# Patient Record
Sex: MALE | Race: WHITE | Employment: FULL TIME | URBAN - METROPOLITAN AREA
[De-identification: names, ages, dates, MRNs, and addresses within clinical notes are randomized per-mention and may not be internally consistent; named-entity substitution may affect disease eponyms.]

---

## 2019-01-11 ENCOUNTER — OFFICE VISIT (OUTPATIENT)
Dept: FAMILY MEDICINE CLINIC | Age: 34
End: 2019-01-11

## 2019-01-11 VITALS
RESPIRATION RATE: 20 BRPM | TEMPERATURE: 97.7 F | HEIGHT: 67 IN | OXYGEN SATURATION: 96 % | BODY MASS INDEX: 24.64 KG/M2 | SYSTOLIC BLOOD PRESSURE: 132 MMHG | HEART RATE: 91 BPM | DIASTOLIC BLOOD PRESSURE: 90 MMHG | WEIGHT: 157 LBS

## 2019-01-11 DIAGNOSIS — R19.5 OCCULT BLOOD IN STOOLS: ICD-10-CM

## 2019-01-11 DIAGNOSIS — R63.1 EXCESSIVE THIRST: ICD-10-CM

## 2019-01-11 DIAGNOSIS — K58.2 IRRITABLE BOWEL SYNDROME WITH BOTH CONSTIPATION AND DIARRHEA: ICD-10-CM

## 2019-01-11 DIAGNOSIS — R53.83 FATIGUE, UNSPECIFIED TYPE: ICD-10-CM

## 2019-01-11 DIAGNOSIS — R03.0 ELEVATED BLOOD PRESSURE READING IN OFFICE WITHOUT DIAGNOSIS OF HYPERTENSION: ICD-10-CM

## 2019-01-11 DIAGNOSIS — Z13.228 SCREENING FOR METABOLIC DISORDER: Primary | ICD-10-CM

## 2019-01-11 DIAGNOSIS — R35.0 FREQUENT URINATION: ICD-10-CM

## 2019-01-11 LAB
BILIRUB UR QL STRIP: NEGATIVE
GLUCOSE UR-MCNC: NORMAL MG/DL
KETONES P FAST UR STRIP-MCNC: NORMAL MG/DL
PH UR STRIP: 6.5 [PH] (ref 4.6–8)
PROT UR QL STRIP: NEGATIVE
SP GR UR STRIP: 1.01 (ref 1–1.03)
UA UROBILINOGEN AMB POC: NORMAL (ref 0.2–1)
URINALYSIS CLARITY POC: CLEAR
URINALYSIS COLOR POC: YELLOW
URINE BLOOD POC: NEGATIVE
URINE LEUKOCYTES POC: NEGATIVE
URINE NITRITES POC: NEGATIVE

## 2019-01-11 RX ORDER — ASPIRIN 81 MG/1
TABLET ORAL DAILY
COMMUNITY

## 2019-01-11 NOTE — PATIENT INSTRUCTIONS
Diet for Irritable Bowel Syndrome: Care Instructions  Your Care Instructions    Irritable bowel syndrome, or IBS, is a problem with the intestines. IBS can cause belly pain, bloating, gas, constipation, and diarrhea. Most people can control their symptoms by changing their diet and easing stress. No specific foods cause everyone with IBS to have symptoms. Doctors don't offer a specific diet to manage symptoms. But many people find that they feel better when they stop eating certain foods. A high-fiber diet may help if you have constipation. Follow-up care is a key part of your treatment and safety. Be sure to make and go to all appointments, and call your doctor if you are having problems. It's also a good idea to know your test results and keep a list of the medicines you take. How can you care for yourself at home? To reduce constipation  · Include fruits, vegetables, beans, and whole grains in your diet each day. These foods are high in fiber. Slowly increase the amount of fiber you eat. This helps you avoid a lot of gas. · Drink plenty of fluids, enough so that your urine is light yellow or clear like water. If you have kidney, heart, or liver disease and have to limit fluids, talk with your doctor before you increase the amount of fluids you drink. · Get some exercise every day. Build up slowly to 30 to 60 minutes a day on 5 or more days of the week. · Take a fiber supplement, such as Citrucel or Metamucil, every day if needed. Read and follow all instructions on the label. · Schedule time each day for a bowel movement. Having a daily routine may help. Take your time and do not strain when having a bowel movement. · Check with your doctor before you increase the amount of fiber in your diet. For some people who have IBS, eating more fiber may make some symptoms worse. This includes bloating. To reduce diarrhea  You may try giving up foods or drinks one at a time to see whether symptoms improve. Limit or avoid the following:  · Alcohol  · Caffeine, which is found in coffee, tea, cola drinks, and chocolate  · Nicotine, from smoking or chewing tobacco  · Gas-producing foods, such as beans, broccoli, cabbage, and apples  · Dairy products that contain lactose (milk sugar), such as ice cream, milk, cheese, and sour cream  · Foods and drinks high in sugar, especially fruit juice, soda, candy, and other packaged sweets (such as cookies)  · Foods high in fat, including lim, sausage, butter, oils, and anything deep-fried  · Sorbitol and xylitol, artificial sweeteners found in some sugarless candies and chewing gum  Keep track of foods  · Some people with IBS use a daily food diary to keep track of what they eat and whether they have any symptoms after eating certain foods. The diary also can be a good way to record what is going on in your life. · Stress plays a role in IBS. So if you are aware that certain stresses bring on symptoms, you can try to reduce those stresses. Keep mealtimes pleasant  · Try to maintain a pleasant environment when you eat. This may reduce stress that can make symptoms likely to occur. · Give yourself plenty of time to eat, rather than eating on the go. Chew your food slowly. Try not to swallow air, which can cause bloating. Where can you learn more? Go to http://shonda-emil.info/. Enter W212 in the search box to learn more about \"Diet for Irritable Bowel Syndrome: Care Instructions. \"  Current as of: March 29, 2018  Content Version: 11.8  © 2298-0100 [employer]. Care instructions adapted under license by [employer] (which disclaims liability or warranty for this information). If you have questions about a medical condition or this instruction, always ask your healthcare professional. [address] any warranty or liability for your use of this information.

## 2019-01-11 NOTE — PROGRESS NOTES
OFFICE NOTE    Javed Elaine is a 26 y.o. male presenting today for office visit. 1/11/2019  4:21 PM    Chief Complaint   Patient presents with   [address]     pt here to establish care    Urinary Frequency     pt here wiith c/o frequent urination has noted for a year       HPI: Patient presenting to office today to establish care. Previous PCP Dr. Delaney Brown. Patient looking for new PCP closer to home. Patient states he has a history of IBS and he is currently not taking any medications. He states within the last week he has notice bright red blood in his stool. He states he was checked for blood in his stool 2 years ago while active in the [employer] but was told it was probably due to constipation or hemroids. Patient states he has daily bowel movements. He also complains of frequent thirst for over a year and within the last week he has developed frequent urination. Patient states his urine stream is normal and denies any discharge or pain during urination. Patient states he use to be on hypertensive medication 3 years ago and was able to come off medication because his blood pressure was normal and under control. Patient has not monitored his blood pressures. Patient denies Chest pain, SOB, headaches, fever, or stomach pain. No other concerns. Review of Systems   Constitutional: Positive for fatigue. Negative for appetite change, chills and fever. HENT: Negative. Respiratory: Negative for cough, chest tightness, shortness of breath and wheezing. Cardiovascular: Negative for chest pain, palpitations and leg swelling. Gastrointestinal: Positive for blood in stool, constipation and diarrhea. Negative for abdominal distention, abdominal pain, nausea and vomiting. Endocrine: Positive for polydipsia and polyuria. Genitourinary: Positive for frequency. Negative for decreased urine volume, difficulty urinating, discharge, dysuria, flank pain and urgency.    Musculoskeletal: Negative. Neurological: Positive for weakness. Negative for dizziness, light-headedness, numbness and headaches. PHQ Screening   PHQ over the last two weeks 1/11/2019   Little interest or pleasure in doing things Several days   Feeling down, depressed, irritable, or hopeless Several days   Total Score PHQ 2 2         History  Past Medical History:   Diagnosis Date    Hypertension     Irritable bowel syndrome        Past Surgical History:   Procedure Laterality Date    HX HEENT      wisdom teeth extraction    HX ORTHOPAEDIC      hammer toe surgery       Social History     Socioeconomic History    Marital status:      Spouse name: Not on file    Number of children: Not on file    Years of education: Not on file    Highest education level: Not on file   Social Needs    Financial resource strain: Not on file    Food insecurity - worry: Not on file    Food insecurity - inability: Not on file   [employer] needs - medical: Not on file   [employer] needs - non-medical: Not on file   Occupational History    Not on file   Tobacco Use    Smoking status: Current Every Day Smoker     Types: Cigars    Smokeless tobacco: Never Used   Substance and Sexual Activity    Alcohol use: Yes     Comment: occasional    Drug use: No    Sexual activity: Not on file   Other Topics Concern    Not on file   Social History Narrative    Not on file       Family History   Problem Relation Age of Onset    High Cholesterol Mother     Diabetes Father     Cancer Sister     Diabetes Maternal Grandmother     Dementia Paternal Grandmother     Heart Disease Paternal Grandfather     Heart Attack Paternal Grandfather        Allergies no known allergies    Current Outpatient Medications   Medication Sig Dispense Refill    aspirin delayed-release 81 mg tablet Take  by mouth daily.            Patient Care Team:  Patient Care Team:  Caden Connolly NP as PCP - General (Nurse Practitioner)        LABS/Results:  No results found for this or any previous visit. RADIOLOGY:  None new to review      Physical Exam   Constitutional: He is oriented to person, place, and time. He appears well-developed and well-nourished. HENT:   Right Ear: External ear normal.   Left Ear: External ear normal.   Eyes: Pupils are equal, round, and reactive to light. Neck: Normal range of motion. Neck supple. Cardiovascular: Normal rate, regular rhythm and normal heart sounds. Pulmonary/Chest: Effort normal and breath sounds normal. No respiratory distress. He has no wheezes. Abdominal: Soft. Bowel sounds are normal. He exhibits no distension. There is no tenderness. There is no rebound and no guarding. Musculoskeletal: Normal range of motion. Lymphadenopathy:     He has no cervical adenopathy. Neurological: He is alert and oriented to person, place, and time. He has normal reflexes. Skin: Skin is warm and dry. Psychiatric: He has a normal mood and affect. Vitals:    01/11/19 1609 01/11/19 1645   BP: (!) 132/96 132/90   Pulse: 91    Resp: 20    Temp: 97.7 °F (36.5 °C)    TempSrc: Oral    SpO2: 96%    Weight: 157 lb (71.2 kg)    Height: 5' 7\" (1.702 m)    PainSc:   0 - No pain          Assessment and Plan      ICD-10-CM ICD-9-CM    1. Screening for metabolic disorder S52.694 V77.99 LIPID PANEL   2. Fatigue, unspecified type R53.83 780.79 CBC W/O DIFF      VITAMIN D, 25 HYDROXY      HEMOGLOBIN A1C WITH EAG   3. Irritable bowel syndrome with both constipation and diarrhea K58.2 564.1 CBC W/O DIFF      REFERRAL TO GASTROENTEROLOGY      CANCELED: REFERRAL TO GASTROENTEROLOGY   4. Elevated blood pressure reading in office without diagnosis of hypertension L35.3 131.8 METABOLIC PANEL, COMPREHENSIVE      TSH 3RD GENERATION      T4, FREE   5.  Occult blood in stools R19.5 792.1 CBC W/O DIFF      OCCULT BLOOD IMMUNOASSAY,DIAGNOSTIC      REFERRAL TO GASTROENTEROLOGY      CANCELED: REFERRAL TO GASTROENTEROLOGY   6. Frequent urination R35.0 788.41 AMB POC URINALYSIS DIP STICK MANUAL W/O MICRO      CULTURE, URINE      HEMOGLOBIN A1C WITH EAG   7. Excessive thirst R63.1 783.5 HEMOGLOBIN A1C WITH EAG   Ordered labs and informed patient I will call him with results. - Advised patient to stop taking daily aspirin at this time. - Instructed patient to monitor blood pressure readings and return in 1 week to assess    - Informed patient if he develop blurred vision, chest pain, or SOB to call 911 and go to nearest ER. Patient agreed with plan      *Plan of care reviewed with patient. Patient in agreement with plan and expresses understanding. All questions answered and patient encouraged to call or RTO if further questions or concerns. *After summary care printed, reviewed and given to patient. Follow-up Disposition:  Return in about 1 week (around 1/18/2019) for 30 minute.

## 2019-01-12 ENCOUNTER — HOSPITAL ENCOUNTER (OUTPATIENT)
Dept: LAB | Age: 34
Discharge: HOME | End: 2019-01-12
Payer: COMMERCIAL

## 2019-01-12 DIAGNOSIS — R19.5 OCCULT BLOOD IN STOOLS: ICD-10-CM

## 2019-01-12 DIAGNOSIS — R03.0 ELEVATED BLOOD PRESSURE READING IN OFFICE WITHOUT DIAGNOSIS OF HYPERTENSION: ICD-10-CM

## 2019-01-12 DIAGNOSIS — R53.83 FATIGUE, UNSPECIFIED TYPE: ICD-10-CM

## 2019-01-12 DIAGNOSIS — R35.0 FREQUENT URINATION: ICD-10-CM

## 2019-01-12 DIAGNOSIS — Z13.228 SCREENING FOR METABOLIC DISORDER: ICD-10-CM

## 2019-01-12 DIAGNOSIS — R63.1 EXCESSIVE THIRST: ICD-10-CM

## 2019-01-12 DIAGNOSIS — K58.2 IRRITABLE BOWEL SYNDROME WITH BOTH CONSTIPATION AND DIARRHEA: ICD-10-CM

## 2019-01-12 LAB
25(OH)D3 SERPL-MCNC: 24.2 NG/ML (ref 30–100)
ALBUMIN SERPL-MCNC: 4.2 G/DL (ref 3.4–5)
ALBUMIN/GLOB SERPL: 1.6 {RATIO} (ref 0.8–1.7)
ALP SERPL-CCNC: 87 U/L (ref 45–117)
ALT SERPL-CCNC: 50 U/L (ref 16–61)
ANION GAP SERPL CALC-SCNC: 6 MMOL/L (ref 3–18)
AST SERPL-CCNC: 22 U/L (ref 15–37)
BILIRUB SERPL-MCNC: 0.9 MG/DL (ref 0.2–1)
BUN SERPL-MCNC: 19 MG/DL (ref 7–18)
BUN/CREAT SERPL: 20 (ref 12–20)
CALCIUM SERPL-MCNC: 8.8 MG/DL (ref 8.5–10.1)
CHLORIDE SERPL-SCNC: 104 MMOL/L (ref 100–108)
CHOLEST SERPL-MCNC: 216 MG/DL
CO2 SERPL-SCNC: 29 MMOL/L (ref 21–32)
CREAT SERPL-MCNC: 0.93 MG/DL (ref 0.6–1.3)
ERYTHROCYTE [DISTWIDTH] IN BLOOD BY AUTOMATED COUNT: 12.1 % (ref 11.6–14.5)
EST. AVERAGE GLUCOSE BLD GHB EST-MCNC: 335 MG/DL
GLOBULIN SER CALC-MCNC: 2.7 G/DL (ref 2–4)
GLUCOSE SERPL-MCNC: 250 MG/DL (ref 74–99)
HBA1C MFR BLD: 13.3 % (ref 4.2–5.6)
HCT VFR BLD AUTO: 46.5 % (ref 36–48)
HDLC SERPL-MCNC: 39 MG/DL (ref 40–60)
HDLC SERPL: 5.5 {RATIO} (ref 0–5)
HGB BLD-MCNC: 16.5 G/DL (ref 13–16)
LDLC SERPL CALC-MCNC: 155.4 MG/DL (ref 0–100)
LIPID PROFILE,FLP: ABNORMAL
MCH RBC QN AUTO: 29.9 PG (ref 24–34)
MCHC RBC AUTO-ENTMCNC: 35.5 G/DL (ref 31–37)
MCV RBC AUTO: 84.4 FL (ref 74–97)
PLATELET # BLD AUTO: 273 K/UL (ref 135–420)
PMV BLD AUTO: 11.5 FL (ref 9.2–11.8)
POTASSIUM SERPL-SCNC: 4.6 MMOL/L (ref 3.5–5.5)
PROT SERPL-MCNC: 6.9 G/DL (ref 6.4–8.2)
RBC # BLD AUTO: 5.51 M/UL (ref 4.7–5.5)
SODIUM SERPL-SCNC: 139 MMOL/L (ref 136–145)
T4 FREE SERPL-MCNC: 1.1 NG/DL (ref 0.7–1.5)
TRIGL SERPL-MCNC: 108 MG/DL (ref ?–150)
TSH SERPL DL<=0.05 MIU/L-ACNC: 1.03 UIU/ML (ref 0.36–3.74)
VLDLC SERPL CALC-MCNC: 21.6 MG/DL
WBC # BLD AUTO: 6.3 K/UL (ref 4.6–13.2)

## 2019-01-12 PROCEDURE — 80061 LIPID PANEL: CPT

## 2019-01-12 PROCEDURE — 84439 ASSAY OF FREE THYROXINE: CPT

## 2019-01-12 PROCEDURE — 82306 VITAMIN D 25 HYDROXY: CPT

## 2019-01-12 PROCEDURE — 80053 COMPREHEN METABOLIC PANEL: CPT

## 2019-01-12 PROCEDURE — 36415 COLL VENOUS BLD VENIPUNCTURE: CPT

## 2019-01-12 PROCEDURE — 85027 COMPLETE CBC AUTOMATED: CPT

## 2019-01-12 PROCEDURE — 87086 URINE CULTURE/COLONY COUNT: CPT

## 2019-01-12 PROCEDURE — 84443 ASSAY THYROID STIM HORMONE: CPT

## 2019-01-12 PROCEDURE — 83036 HEMOGLOBIN GLYCOSYLATED A1C: CPT

## 2019-01-13 LAB
BACTERIA SPEC CULT: NORMAL
SERVICE CMNT-IMP: NORMAL

## 2019-01-14 ENCOUNTER — HOSPITAL ENCOUNTER (OUTPATIENT)
Dept: LAB | Age: 34
Discharge: HOME | End: 2019-01-14
Payer: COMMERCIAL

## 2019-01-14 ENCOUNTER — DOCUMENTATION ONLY (OUTPATIENT)
Dept: FAMILY MEDICINE CLINIC | Age: 34
End: 2019-01-14

## 2019-01-14 PROCEDURE — 82274 ASSAY TEST FOR BLOOD FECAL: CPT

## 2019-01-14 NOTE — PROGRESS NOTES
Spoke to patient informed him of lab results. Patient is scheduled to come in office this week to further discuss his healthcare.

## 2019-01-18 ENCOUNTER — OFFICE VISIT (OUTPATIENT)
Dept: FAMILY MEDICINE CLINIC | Age: 34
End: 2019-01-18

## 2019-01-18 VITALS
HEIGHT: 67 IN | HEART RATE: 78 BPM | WEIGHT: 157 LBS | DIASTOLIC BLOOD PRESSURE: 90 MMHG | BODY MASS INDEX: 24.64 KG/M2 | OXYGEN SATURATION: 99 % | RESPIRATION RATE: 16 BRPM | SYSTOLIC BLOOD PRESSURE: 129 MMHG | TEMPERATURE: 96.8 F

## 2019-01-18 DIAGNOSIS — E78.5 HYPERLIPIDEMIA, UNSPECIFIED HYPERLIPIDEMIA TYPE: ICD-10-CM

## 2019-01-18 DIAGNOSIS — E11.65 UNCONTROLLED TYPE 2 DIABETES MELLITUS WITH HYPERGLYCEMIA (HCC): Primary | ICD-10-CM

## 2019-01-18 LAB — HEMOCCULT STL QL IA: NEGATIVE

## 2019-01-18 RX ORDER — INSULIN PUMP SYRINGE, 3 ML
EACH MISCELLANEOUS
Qty: 1 KIT | Refills: 0 | Status: SHIPPED | OUTPATIENT
Start: 2019-01-18

## 2019-01-18 RX ORDER — SIMVASTATIN 20 MG/1
20 TABLET, FILM COATED ORAL
Qty: 30 TAB | Refills: 0 | Status: SHIPPED | OUTPATIENT
Start: 2019-01-18 | End: 2019-02-15 | Stop reason: SDUPTHER

## 2019-01-18 RX ORDER — LANCETS
EACH MISCELLANEOUS
Qty: 100 EACH | Refills: 0 | Status: SHIPPED | OUTPATIENT
Start: 2019-01-18

## 2019-01-18 NOTE — PROGRESS NOTES
Chief Complaint   Patient presents with    Hypertension     discuss recent lab results. 1. Have you been to the ER, urgent care clinic since your last visit? Hospitalized since your last visit? Yes, Urgent care on 1/13/19 for severe constipation. 2. Have you seen or consulted any other health care providers outside of the [address] [place] [address] since your last visit? Include any pap smears or colon screening.  No

## 2019-01-18 NOTE — PROGRESS NOTES
OFFICE NOTE    Sangeeta Chandra is a 26 y.o. male presenting today for office visit. 1/21/2019  5:02 PM    Chief Complaint   Patient presents with    Hypertension     discuss recent lab results. HPI: Patient presented as a follow up to discuss his hypertension and lab results. Patient states his hypertension has been running around 130's/70's at home. Patient states he forgot to bring blood pressure readings to the office. Patient mailed FOBT , he denies bloody stools, or diarrrha at this time. Patient denies chest pain,SOB, headaches, blurred vision, numbness or tingling. No other concerns today. Review of Systems   Constitutional: Positive for fatigue. Negative for appetite change, chills and fever. HENT: Negative. Respiratory: Negative for cough, chest tightness, shortness of breath and wheezing. Cardiovascular: Negative for chest pain, palpitations and leg swelling. Gastrointestinal: Negative for abdominal distention, abdominal pain, blood in stool, constipation and diarrhea. Endocrine: Positive for polydipsia and polyuria. Genitourinary: Positive for frequency. Negative for decreased urine volume, difficulty urinating, discharge, dysuria, flank pain and urgency. Musculoskeletal: Negative. Neurological: Negative for dizziness, weakness, light-headedness, numbness and headaches.          PHQ Screening   PHQ over the last two weeks 1/11/2019   Little interest or pleasure in doing things Several days   Feeling down, depressed, irritable, or hopeless Several days   Total Score PHQ 2 2   Trouble falling or staying asleep, or sleeping too much More than half the days   Feeling tired or having little energy More than half the days   Poor appetite, weight loss, or overeating Not at all   Feeling bad about yourself - or that you are a failure or have let yourself or your family down Several days   Trouble concentrating on things such as school, work, reading, or watching TV Several days   Moving or speaking so slowly that other people could have noticed; or the opposite being so fidgety that others notice Several days   Thoughts of being better off dead, or hurting yourself in some way Not at all   PHQ 9 Score 9   How difficult have these problems made it for you to do your work, take care of your home and get along with others Somewhat difficult         History  Past Medical History:   Diagnosis Date    Hypertension     Irritable bowel syndrome        Past Surgical History:   Procedure Laterality Date    HX HEENT      wisdom teeth extraction    HX ORTHOPAEDIC      hammer toe surgery       Social History     Socioeconomic History    Marital status:      Spouse name: Not on file    Number of children: Not on file    Years of education: Not on file    Highest education level: Not on file   Social Needs    Financial resource strain: Not on file    Food insecurity - worry: Not on file    Food insecurity - inability: Not on file   [employer] needs - medical: Not on file   [employer] needs - non-medical: Not on file   Occupational History    Not on file   Tobacco Use    Smoking status: Current Every Day Smoker     Types: Cigars    Smokeless tobacco: Never Used   Substance and Sexual Activity    Alcohol use: Yes     Comment: occasional    Drug use: No    Sexual activity: Not on file   Other Topics Concern    Not on file   Social History Narrative    Not on file       Family History   Problem Relation Age of Onset    High Cholesterol Mother     Diabetes Father     Cancer Sister     Diabetes Maternal Grandmother     Dementia Paternal Grandmother     Heart Disease Paternal Grandfather     Heart Attack Paternal Grandfather        No Known Allergies    Current Outpatient Medications   Medication Sig Dispense Refill    glyBURIDE-metFORMIN (GLUCOVANCE) 2.5-500 mg per tablet Take one tablet daily with meals 30 Tab 0    simvastatin (ZOCOR) 20 mg tablet Take 1 Tab by mouth nightly. 30 Tab 0    Blood-Glucose Meter monitoring kit Check blood sugar three times a day after meals 1 Kit 0    glucose blood VI test strips (ASCENSIA AUTODISC VI, ONE TOUCH ULTRA TEST VI) strip Check blood sugar three times a day after each meal 100 Strip 0    lancets misc Check blood sugar three times a day after each meal 100 Each 0    aspirin delayed-release 81 mg tablet Take  by mouth daily. Patient Care Team:  Patient Care Team:  Alexia Clarke NP as PCP - General (Nurse Practitioner)        LABS/Results:  Results for orders placed or performed during the hospital encounter of 01/14/19   OCCULT BLOOD IMMUNOASSAY,DIAGNOSTIC   Result Value Ref Range    Occult blood fecal, by IA NEGATIVE  NEGATIVE           RADIOLOGY:  None new to review      Physical Exam   Constitutional: He is oriented to person, place, and time. He appears well-developed and well-nourished. HENT:   Right Ear: External ear normal.   Left Ear: External ear normal.   Eyes: Pupils are equal, round, and reactive to light. Neck: Normal range of motion. Neck supple. Cardiovascular: Normal rate, regular rhythm and normal heart sounds. Pulmonary/Chest: Effort normal and breath sounds normal. No respiratory distress. He has no wheezes. Abdominal: Soft. Bowel sounds are normal. He exhibits no distension. There is no tenderness. There is no rebound and no guarding. Musculoskeletal: Normal range of motion. Lymphadenopathy:     He has no cervical adenopathy. Neurological: He is alert and oriented to person, place, and time. He has normal reflexes. Skin: Skin is warm and dry. Psychiatric: He has a normal mood and affect. Vitals:    01/18/19 1552   BP: 129/90   Pulse: 78   Resp: 16   Temp: 96.8 °F (36 °C)   TempSrc: Oral   SpO2: 99%   Weight: 157 lb (71.2 kg)   Height: 5' 7\" (1.702 m)   PainSc:   0 - No pain         Assessment and Plan      ICD-10-CM ICD-9-CM    1.  Uncontrolled type 2 diabetes mellitus with hyperglycemia ([facility]) E11.65 250.02 Blood-Glucose Meter monitoring kit      glucose blood VI test strips (ASCENSIA AUTODISC VI, ONE TOUCH ULTRA TEST VI) strip      lancets misc      glyBURIDE-metFORMIN (GLUCOVANCE) 2.5-500 mg per tablet         2. Hyperlipidemia, unspecified hyperlipidemia type E78.5 272.4 simvastatin (ZOCOR) 20 mg tablet     Advised patient to take medication as ordered. Reviewed lab results, education materials for diabetes given to patient with tracking book. Discussed healthy eating habits and exercise with patient. Outpatient diabetes education information given to patient to register for the free diabetes classes. Patient agree with plan of care. Request for patient to return to office 2 weeks after starting medication. *Plan of care reviewed with patient. Patient in agreement with plan and expresses understanding. All questions answered and patient encouraged to call or RTO if further questions or concerns. *After summary care printed, reviewed and given to patient. Follow-up Disposition:  Return in about 2 weeks (around 2/1/2019) for 30 minute.

## 2019-01-18 NOTE — PATIENT INSTRUCTIONS
Diabetes Blood Sugar Emergencies: Your Action Plan  How can you prevent a blood sugar emergency? An important part of living with diabetes is keeping your blood sugar in your target range. You'll need to know what to do if it's too high or too low. Managing your blood sugar levels helps you avoid emergencies. This care sheet will teach you about the signs of high and low blood sugar. It will help you make an action plan with your doctor for when these signs occur. Low blood sugar is more likely to happen if you take certain medicines for diabetes. It can also happen if you skip a meal, drink alcohol, or exercise more than usual.  You may get high blood sugar if you eat differently than you normally do. One example is eating more carbohydrate than usual. Having a cold, the flu, or other sudden illness can also cause high blood sugar levels. Levels can also rise if you miss a dose of medicine. Any change in how you take your medicine may affect your blood sugar level. So it's important to work with your doctor before you make any changes. Check your blood sugar  Work with your doctor to fill in the blank spaces below that apply to you. Track your levels, know your target range, and write down ways you can get your blood sugar back in your target range. A log book can help you track your levels. Take the book to all of your medical appointments. · Check your blood sugar _____ times a day, at these times:________________________________________________. (For example: Before meals, at bedtime, before exercise, during exercise, other.)  · Your blood sugar target range before a meal is ___________________. Your blood sugar target range after a meal is _______________________. · Do this--___________________________________________________--to get your blood sugar back within your safe range if your blood sugar results are _________________________________________.  (For example: Less than 70 or above 250 mg/dL.)  Call your doctor when your blood sugar results are ___________________________________. (For example: Less than 70 or above 250 mg/dL.)  What are the symptoms of low and high blood sugar? Common symptoms of low blood sugar are sweating and feeling shaky, weak, hungry, or confused. Symptoms can start quickly. Common symptoms of high blood sugar are feeling very thirsty or very hungry. You may also pass urine more often than usual. You may have blurry vision and may lose weight without trying. But some people may have high or low blood sugar without having any symptoms. That's a good reason to check your blood sugar on a regular schedule. What should you do if you have symptoms? Work with your doctor to fill in the blank spaces below that apply to you. Low blood sugar  If you have symptoms of low blood sugar, check your blood sugar. If it's below _____ ( for example, below 70), eat or drink a quick-sugar food that has about 15 grams of carbohydrate. Your goal is to get your level back to your safe range. Check your blood sugar again 15 minutes later. If it's still not in your target range, take another 15 grams of carbohydrate and check your blood sugar again in 15 minutes. Repeat this until you reach your target. Then go back to your regular testing schedule. When you have low blood sugar, it's best to stop or reduce any physical activity until your blood sugar is back in your target range and is stable. If you must stay active, eat or drink 30 grams of carbohydrate. Then check your blood sugar again in 15 minutes. If it's not in your target range, take another 30 grams of carbohydrates. Check your blood sugar again in 15 minutes. Keep doing this until you reach your target. You can then go back to your regular testing schedule. If your symptoms or blood sugar levels are getting worse or have not improved after 15 minutes, seek medical care right away.   Here are some examples of quick-sugar foods with 15 grams of carbohydrate:  · 3 or 4 glucose tablets  · 1 tube of glucose gel  · Hard candy (such as 3 Jolly Ranchers or 5 to 7 Life Savers)  · ½ cup to ¾ cup (4 to 6 ounces) of fruit juice or regular (not diet) soda  High blood sugar  If you have symptoms of high blood sugar, check your blood sugar. Your goal is to get your level back to your target range. If it's above ______ ( for example, above 250), follow these steps:  · If you missed a dose of your diabetes medicine, take it now. Take only the amount of medicine that you have been prescribed. Do not take more or less medicine. · Give yourself insulin if your doctor has prescribed it for high blood sugar. · Test for ketones, if the doctor told you to do so. If the results of the ketone test show a moderate-to-large amount of ketones, call the doctor for advice. · Wait 30 minutes after you take the extra insulin or the missed medicine. Check your blood sugar again. If your symptoms or blood sugar levels are getting worse or have not improved after taking these steps, seek medical care right away. Follow-up care is a key part of your treatment and safety. Be sure to make and go to all appointments, and call your doctor if you are having problems. It's also a good idea to know your test results and keep a list of the medicines you take. Where can you learn more? Go to http://shonda-emil.info/. Enter U054 in the search box to learn more about \"Diabetes Blood Sugar Emergencies: Your Action Plan. \"  Current as of: July 25, 2018  Content Version: 11.9  © 6034-8478 [employer]. Care instructions adapted under license by [employer] (which disclaims liability or warranty for this information). If you have questions about a medical condition or this instruction, always ask your healthcare professional. [address] any warranty or liability for your use of this information.

## 2019-01-21 RX ORDER — GLYBURIDE-METFORMIN HYDROCHLORIDE 2.5; 5 MG/1; MG/1
TABLET ORAL
Qty: 30 TAB | Refills: 0 | Status: SHIPPED | OUTPATIENT
Start: 2019-01-21 | End: 2019-02-08 | Stop reason: SDUPTHER

## 2019-01-25 ENCOUNTER — TELEPHONE (OUTPATIENT)
Dept: FAMILY MEDICINE CLINIC | Age: 34
End: 2019-01-25

## 2019-01-25 NOTE — TELEPHONE ENCOUNTER
Patient wife called in regards to this paient. Was just recently diagnose as diabetic, was put on glucovance. Took bs this morning was 280, lunch time it was 180, down to 144, had patient to eat an orange and went down to 288, now shaky and sweaty. Next time check at 3:05 it was 183. ... Has not taken any medication since then. Patient is requesting to be contacted to know if this Is something to be expected.   Please contact patient when available

## 2019-02-08 ENCOUNTER — OFFICE VISIT (OUTPATIENT)
Dept: FAMILY MEDICINE CLINIC | Age: 34
End: 2019-02-08

## 2019-02-08 VITALS
BODY MASS INDEX: 24.96 KG/M2 | TEMPERATURE: 96.7 F | RESPIRATION RATE: 18 BRPM | HEART RATE: 81 BPM | HEIGHT: 67 IN | WEIGHT: 159 LBS | OXYGEN SATURATION: 98 % | SYSTOLIC BLOOD PRESSURE: 123 MMHG | DIASTOLIC BLOOD PRESSURE: 90 MMHG

## 2019-02-08 DIAGNOSIS — E11.65 UNCONTROLLED TYPE 2 DIABETES MELLITUS WITH HYPERGLYCEMIA (HCC): ICD-10-CM

## 2019-02-08 RX ORDER — GLYBURIDE-METFORMIN HYDROCHLORIDE 2.5; 5 MG/1; MG/1
TABLET ORAL
Qty: 30 TAB | Refills: 3 | Status: SHIPPED | OUTPATIENT
Start: 2019-02-08 | End: 2019-03-19 | Stop reason: SDUPTHER

## 2019-02-08 NOTE — PROGRESS NOTES
Chief Complaint   Patient presents with    Follow-up     Routine care     1. Have you been to the ER, urgent care clinic since your last visit? Hospitalized since your last visit? No    2. Have you seen or consulted any other health care providers outside of the [address] [place] [address] since your last visit? Include any pap smears or colon screening.  No

## 2019-02-08 NOTE — PROGRESS NOTES
OFFICE NOTE    Matthew Shown is a 26 y.o. male presenting today for office visit. 2/8/2019  3:44 PM    Chief Complaint   Patient presents with    Follow-up     Routine care       HPI: Patient presented to the office to follow up on diabetes. Patient states he have been taking his medication as prescribed and feeling much better. Patient states he was taking diabetes medication just at night at first and then he changed it to just in the morning when he eats his breakfast.  He states he noticed his blood sugar is almost 200. However he states the medication normally throughout the day keep him to around 100. He states he has more energy and feel much better since starting the medication. He states he does watch his food intake and calorie count and her exercise daily. Patient has incorporating a exercise routine while at work. No other concerns. Review of Systems   Constitutional: Negative for appetite change, chills, fatigue and fever. HENT: Negative. Eyes: Negative. Respiratory: Negative for cough, chest tightness, shortness of breath and wheezing. Cardiovascular: Negative for chest pain, palpitations and leg swelling. Gastrointestinal: Negative for abdominal distention, abdominal pain, nausea and vomiting. Endocrine: Negative for polydipsia, polyphagia and polyuria. Musculoskeletal: Negative. Skin: Negative. Neurological: Negative for dizziness, syncope, weakness, light-headedness, numbness and headaches.          PHQ Screening   PHQ over the last two weeks 2/8/2019   Little interest or pleasure in doing things Not at all   Feeling down, depressed, irritable, or hopeless Not at all   Total Score PHQ 2 0   Trouble falling or staying asleep, or sleeping too much -   Feeling tired or having little energy -   Poor appetite, weight loss, or overeating -   Feeling bad about yourself - or that you are a failure or have let yourself or your family down -   Trouble concentrating on things such as school, work, reading, or watching TV -   Moving or speaking so slowly that other people could have noticed; or the opposite being so fidgety that others notice -   Thoughts of being better off dead, or hurting yourself in some way -   PHQ 9 Score -   How difficult have these problems made it for you to do your work, take care of your home and get along with others -         History  Past Medical History:   Diagnosis Date    Hypertension     Irritable bowel syndrome        Past Surgical History:   Procedure Laterality Date    HX HEENT      wisdom teeth extraction    HX ORTHOPAEDIC      hammer toe surgery       Social History     Socioeconomic History    Marital status:      Spouse name: Not on file    Number of children: Not on file    Years of education: Not on file    Highest education level: Not on file   Social Needs    Financial resource strain: Not on file    Food insecurity - worry: Not on file    Food insecurity - inability: Not on file   [employer] needs - medical: Not on file   [employer] needs - non-medical: Not on file   Occupational History    Not on file   Tobacco Use    Smoking status: Current Some Day Smoker     Types: Cigars    Smokeless tobacco: Never Used   Substance and Sexual Activity    Alcohol use: Yes     Comment: occasional    Drug use: No    Sexual activity: Not on file   Other Topics Concern    Not on file   Social History Narrative    Not on file       Family History   Problem Relation Age of Onset    High Cholesterol Mother     Diabetes Father     Cancer Sister     Diabetes Maternal Grandmother     Dementia Paternal Grandmother     Heart Disease Paternal Grandfather     Heart Attack Paternal Grandfather        No Known Allergies    Current Outpatient Medications   Medication Sig Dispense Refill    glyBURIDE-metFORMIN (GLUCOVANCE) 2.5-500 mg per tablet Take one tablet twice daily with meals 30 Tab 3    glucose blood VI test strips (ASCENSIA AUTODISC VI, ONE TOUCH ULTRA TEST VI) strip Check blood sugar three times a day after each meal 100 Strip 5    simvastatin (ZOCOR) 20 mg tablet Take 1 Tab by mouth nightly. 30 Tab 0    Blood-Glucose Meter monitoring kit Check blood sugar three times a day after meals 1 Kit 0    lancets misc Check blood sugar three times a day after each meal 100 Each 0    aspirin delayed-release 81 mg tablet Take  by mouth daily. Health Maintenance reviewed - HM discussed    Patient Care Team:  Patient Care Team:  Corrine Castillo NP as PCP - General (Nurse Practitioner)        LABS/Results:  Results for orders placed or performed during the hospital encounter of 01/14/19   OCCULT BLOOD IMMUNOASSAY,DIAGNOSTIC   Result Value Ref Range    Occult blood fecal, by IA NEGATIVE  NEGATIVE           RADIOLOGY:  None new to review      Physical Exam   Constitutional: He is oriented to person, place, and time. He appears well-developed and well-nourished. Eyes: Pupils are equal, round, and reactive to light. Neck: Normal range of motion. Neck supple. Cardiovascular: Normal rate, regular rhythm and normal heart sounds. Pulmonary/Chest: Effort normal and breath sounds normal. No respiratory distress. He has no wheezes. He has no rales. Musculoskeletal: Normal range of motion. Lymphadenopathy:     He has no cervical adenopathy. Neurological: He is alert and oriented to person, place, and time. He has normal reflexes. Skin: Skin is warm and dry. Psychiatric: He has a normal mood and affect. Vitals:    02/08/19 1537   BP: 123/90   Pulse: 81   Resp: 18   Temp: 96.7 °F (35.9 °C)   TempSrc: Oral   SpO2: 98%   Weight: 159 lb (72.1 kg)   Height: 5' 7\" (1.702 m)   PainSc:   0 - No pain         Assessment and Plan      ICD-10-CM ICD-9-CM    1.  Uncontrolled type 2 diabetes mellitus with hyperglycemia (HCC) E11.65 250.02 glyBURIDE-metFORMIN (GLUCOVANCE) 2.5-500 mg per tablet      glucose blood VI test strips (ASCENSIA AUTODISC VI, ONE TOUCH ULTRA TEST VI) strip     Advised patient patient take medication as prescribed. Increased medication to twice daily. Patient will continue to monitor blood sugar readings. Will see back in 2 weeks for medication monitoring. Will work on HM next visit. Patient agree with the plan of care. *Plan of care reviewed with patient. Patient in agreement with plan and expresses understanding. All questions answered and patient encouraged to call or RTO if further questions or concerns. *After summary care printed, reviewed and given to patient. Follow-up Disposition:  Return in about 2 weeks (around 2/22/2019) for 30 minute.

## 2019-02-12 NOTE — PATIENT INSTRUCTIONS
Learning About Diabetes and Exercise  Can you exercise if you have diabetes? When you have diabetes, it's important to get regular exercise. This helps control your blood sugar level. You can still play sports, run, ride a bike, go swimming, and do other activities when you have diabetes. How can exercise help you manage diabetes? Your body turns the food you eat into glucose, a type of sugar. You need this sugar for energy. When you have diabetes, the sugar builds up in your blood. But when you exercise, your body uses sugar. This helps keep it from building up in your blood and results in lower blood sugar and better control of diabetes. Exercise may help you in other ways too. It can help you reach and stay at a healthy weight. It also helps improve blood pressure and cholesterol, which can reduce the risk of heart disease. Exercise can make you feel stronger and happier. It can help you relax and sleep better, and give you confidence in other things you do. How can you exercise safely? Before you start a new exercise program, talk to your doctor about how and when to exercise. You may need to have a medical exam and tests before you begin. Some types of exercise can be harmful if your diabetes is causing other problems, such as problems with your feet. Your doctor can tell you what types of exercise are good choices for you. These tips can help you exercise safely when you have diabetes. If your diabetes is controlled by diet or medicine that doesn't lower your blood sugar, you don't need to eat a snack before you exercise. · Check your blood sugar before you exercise. And be careful about what you eat. ? If your blood sugar is less than 100, eat a carbohydrate snack before you exercise. ? Be careful when you exercise if your blood sugar is over 300. High blood sugar can make you dehydrated. And that makes your blood sugar levels go even higher.  If you have ketones in your blood or urine and your blood sugar is over 300, do not exercise. · Don't try to do too much at first. Build up your exercise program bit by bit. Try to get at least 30 minutes of exercise on most days of the week. Walking is a good choice. You also may want to do other activities, such as riding a bike or swimming. You might try running or gardening. Try to include muscle-strengthening exercises at least 2 times a week. These exercises include push-ups and weight training. You can also use rubber tubing or stretch bands. You stretch or pull the tubing or band to build muscle strength. If you want to exercise more, slowly increase how hard or long you exercise. · You may get symptoms of low blood sugar during exercise or up to 24 hours later. Some symptoms of low blood sugar, such as sweating, a fast heartbeat, or feeling tired, can be confused with what can happen anytime you exercise. Other symptoms may include feeling anxious, dizzy, weak, or shaky. So it's a good idea to check your blood sugar again. · You can treat low blood sugar by eating or drinking something that has 15 grams of carbohydrate. These should be quick-sugar foods. Derl Scales foods such as fruit juice, regular (not diet) soda, glucose tablets, hard candy, or raisins can help raise blood sugar. Check your blood sugar level again 15 minutes after having a quick-sugar food to make sure your level is getting back to your target range. · Drink plenty of water before, during, and after you exercise. · Wear medical alert jewelry that says you have diabetes. You can buy this at most drugstores. · Pay attention to your body. If you are used to exercise and notice that you can't do as much as usual, talk to your doctor. Follow-up care is a key part of your treatment and safety. Be sure to make and go to all appointments, and call your doctor if you are having problems. It's also a good idea to know your test results and keep a list of the medicines you take.   Where can you learn more? Go to http://shonda-emil.info/. Enter D095 in the search box to learn more about \"Learning About Diabetes and Exercise. \"  Current as of: July 25, 2018  Content Version: 11.9  © 4429-0613 [employer]. Care instructions adapted under license by [employer] (which disclaims liability or warranty for this information). If you have questions about a medical condition or this instruction, always ask your healthcare professional. [place] [address] any warranty or liability for your use of this information.

## 2019-02-15 DIAGNOSIS — E78.5 HYPERLIPIDEMIA, UNSPECIFIED HYPERLIPIDEMIA TYPE: ICD-10-CM

## 2019-02-15 RX ORDER — SIMVASTATIN 20 MG/1
20 TABLET, FILM COATED ORAL
Qty: 30 TAB | Refills: 3 | Status: SHIPPED | OUTPATIENT
Start: 2019-02-15 | End: 2019-04-05 | Stop reason: ALTCHOICE

## 2019-02-15 RX ORDER — SIMVASTATIN 20 MG/1
20 TABLET, FILM COATED ORAL
Qty: 30 TAB | Refills: 0 | Status: CANCELLED | OUTPATIENT
Start: 2019-02-15

## 2019-02-15 NOTE — TELEPHONE ENCOUNTER
Requested Prescriptions     Pending Prescriptions Disp Refills    simvastatin (ZOCOR) 20 mg tablet 30 Tab 0     Sig: Take 1 Tab by mouth nightly.

## 2019-03-19 DIAGNOSIS — E11.65 UNCONTROLLED TYPE 2 DIABETES MELLITUS WITH HYPERGLYCEMIA (HCC): ICD-10-CM

## 2019-03-19 RX ORDER — GLYBURIDE-METFORMIN HYDROCHLORIDE 2.5; 5 MG/1; MG/1
TABLET ORAL
Qty: 30 TAB | Refills: 3 | Status: SHIPPED | OUTPATIENT
Start: 2019-03-19 | End: 2019-03-20 | Stop reason: SDUPTHER

## 2019-03-19 NOTE — TELEPHONE ENCOUNTER
Requested Prescriptions     Pending Prescriptions Disp Refills    glyBURIDE-metFORMIN (GLUCOVANCE) 2.5-500 mg per tablet 30 Tab 3     Sig: Take one tablet twice daily with meals     Requesting 90 Day supply  Ana vizcarra

## 2019-03-20 ENCOUNTER — TELEPHONE (OUTPATIENT)
Dept: FAMILY MEDICINE CLINIC | Age: 34
End: 2019-03-20

## 2019-03-20 DIAGNOSIS — E11.65 UNCONTROLLED TYPE 2 DIABETES MELLITUS WITH HYPERGLYCEMIA (HCC): ICD-10-CM

## 2019-03-20 RX ORDER — GLYBURIDE-METFORMIN HYDROCHLORIDE 2.5; 5 MG/1; MG/1
TABLET ORAL
Qty: 90 TAB | Refills: 1 | Status: SHIPPED | OUTPATIENT
Start: 2019-03-20 | End: 2019-04-05 | Stop reason: SDUPTHER

## 2019-03-20 NOTE — TELEPHONE ENCOUNTER
80 DAYS SUPPLY : REQUEST FOR AUTHORIZATION    GLYBURIDE-METFORMIN 2.5-500 MG    QTY: 80    TAKE ONE TABLET TWICE DAILY WITH MEALS    Former pt of [facility], pt has not been seen since his last visit with [facility]

## 2019-03-20 NOTE — TELEPHONE ENCOUNTER
Medication was sent for 30 day supply on yesterday. Patient request 90 day supply at this time.  Please Advise

## 2019-04-05 ENCOUNTER — OFFICE VISIT (OUTPATIENT)
Dept: FAMILY MEDICINE CLINIC | Age: 34
End: 2019-04-05

## 2019-04-05 VITALS
HEIGHT: 67 IN | DIASTOLIC BLOOD PRESSURE: 89 MMHG | SYSTOLIC BLOOD PRESSURE: 127 MMHG | RESPIRATION RATE: 20 BRPM | WEIGHT: 173 LBS | HEART RATE: 79 BPM | TEMPERATURE: 96 F | OXYGEN SATURATION: 97 % | BODY MASS INDEX: 27.15 KG/M2

## 2019-04-05 DIAGNOSIS — E78.2 MIXED HYPERLIPIDEMIA: ICD-10-CM

## 2019-04-05 DIAGNOSIS — R51.9 NONINTRACTABLE EPISODIC HEADACHE, UNSPECIFIED HEADACHE TYPE: ICD-10-CM

## 2019-04-05 DIAGNOSIS — I10 ESSENTIAL HYPERTENSION: Primary | ICD-10-CM

## 2019-04-05 DIAGNOSIS — E11.8 TYPE 2 DIABETES MELLITUS WITH COMPLICATION, WITHOUT LONG-TERM CURRENT USE OF INSULIN (HCC): ICD-10-CM

## 2019-04-05 DIAGNOSIS — G47.00 INSOMNIA, UNSPECIFIED TYPE: ICD-10-CM

## 2019-04-05 DIAGNOSIS — R53.83 FATIGUE, UNSPECIFIED TYPE: ICD-10-CM

## 2019-04-05 DIAGNOSIS — E55.9 VITAMIN D DEFICIENCY: ICD-10-CM

## 2019-04-05 PROBLEM — E78.5 HYPERLIPIDEMIA: Status: ACTIVE | Noted: 2019-01-01

## 2019-04-05 LAB — HBA1C MFR BLD HPLC: 6.7 %

## 2019-04-05 RX ORDER — GLYBURIDE-METFORMIN HYDROCHLORIDE 2.5; 5 MG/1; MG/1
1 TABLET ORAL 2 TIMES DAILY WITH MEALS
Qty: 180 TAB | Refills: 1 | Status: SHIPPED | OUTPATIENT
Start: 2019-04-05 | End: 2019-09-13 | Stop reason: SDUPTHER

## 2019-04-05 RX ORDER — AMITRIPTYLINE HYDROCHLORIDE 10 MG/1
10 TABLET, FILM COATED ORAL
Qty: 30 TAB | Refills: 0 | Status: SHIPPED | OUTPATIENT
Start: 2019-04-05 | End: 2019-06-04 | Stop reason: SDUPTHER

## 2019-04-05 RX ORDER — ROSUVASTATIN CALCIUM 10 MG/1
10 TABLET, COATED ORAL
Qty: 90 TAB | Refills: 1 | Status: SHIPPED | OUTPATIENT
Start: 2019-04-05 | End: 2019-09-13 | Stop reason: SDUPTHER

## 2019-04-05 NOTE — PATIENT INSTRUCTIONS

## 2019-04-05 NOTE — PROGRESS NOTES
OFFICE NOTE    Jamilah Lucero is a 26 y.o. male presenting today for office visit. 4/5/2019  11:01 AM    Chief Complaint   Patient presents with    Establish Care    Insomnia    Fatigue       HPI: Here today transitioning care from Melissa Ann NP since her departure from office. Chronic disease routine care. Last routine labs completed 1/2019. Not following with any specialists. HTN/Hyperlipidemia: Reports being diagnosed with HTN in the past- had been intermittently on medication but most recently has not needed. Started on Zocor in 1/2019 but ran out after only taking for about a month. Last  1/2019. EKG 10/2017 NSR. Diabetes: Newly diagnosed 1/2019 with A1c of 13.3%. Started on Glyburide/Metformin at that time- has been taking BID. He states this sugars have been running pretty good at home- AM readings seem to be the highest of around 150-160 and lunch is the lowest around 100. He denies any SE of medication. Complains of insomnia that has been happening for a few weeks. He is not sure of any stressors or possible causes but this has been causing him to feel fatigued. He has had this happen in the past and has tried Benadryl, Melatonin, and Unisom OTC without any improvement. He states that since he has not been sleeping well, he has also been having some headaches. Vit D deficiency noted 1/2019- had been taking Vit D OTC but ran out a few weeks ago. Review of Systems   Constitutional: Positive for fatigue. Negative for chills and fever. Respiratory: Negative for cough, shortness of breath and wheezing. Cardiovascular: Negative for chest pain, palpitations and leg swelling. Gastrointestinal: Negative for abdominal pain, constipation, diarrhea, nausea and vomiting. Genitourinary: Negative for difficulty urinating and frequency. Musculoskeletal: Negative for arthralgias and myalgias. Skin: Negative for rash. Neurological: Positive for headaches.  Negative for dizziness. Psychiatric/Behavioral: Positive for sleep disturbance. Negative for dysphoric mood. The patient is not nervous/anxious.           PHQ Screening   3 most recent PHQ Screens 4/5/2019   Little interest or pleasure in doing things Not at all   Feeling down, depressed, irritable, or hopeless Not at all   Total Score PHQ 2 0   Trouble falling or staying asleep, or sleeping too much -   Feeling tired or having little energy -   Poor appetite, weight loss, or overeating -   Feeling bad about yourself - or that you are a failure or have let yourself or your family down -   Trouble concentrating on things such as school, work, reading, or watching TV -   Moving or speaking so slowly that other people could have noticed; or the opposite being so fidgety that others notice -   Thoughts of being better off dead, or hurting yourself in some way -   PHQ 9 Score -   How difficult have these problems made it for you to do your work, take care of your home and get along with others -         History  Past Medical History:   Diagnosis Date    Diabetes ([facility] [address] 01/2019    Hyperlipidemia 01/2019    Hypertension     intermittently for a few years    Irritable bowel syndrome        Past Surgical History:   Procedure Laterality Date    HX ORTHOPAEDIC      hammer toe surgery    HX WISDOM TEETH EXTRACTION         Social History     Socioeconomic History    Marital status:      Spouse name: Not on file    Number of children: Not on file    Years of education: Not on file    Highest education level: Not on file   Occupational History    Not on file   Social Needs    Financial resource strain: Not on file    Food insecurity:     Worry: Not on file     Inability: Not on file    Transportation needs:     Medical: Not on file     Non-medical: Not on file   Tobacco Use    Smoking status: Current Some Day Smoker     Types: Cigars    Smokeless tobacco: Current User     Types: Chew   Substance and Sexual Activity  Alcohol use: Yes     Comment: occasional    Drug use: No    Sexual activity: Not on file   Lifestyle    Physical activity:     Days per week: Not on file     Minutes per session: Not on file    Stress: Not on file   Relationships    Social connections:     Talks on phone: Not on file     Gets together: Not on file     Attends Taoist service: Not on file     Active member of club or organization: Not on file     Attends meetings of clubs or organizations: Not on file     Relationship status: Not on file    Intimate partner violence:     Fear of current or ex partner: Not on file     Emotionally abused: Not on file     Physically abused: Not on file     Forced sexual activity: Not on file   Other Topics Concern    Not on file   Social History Narrative    Not on file       Family History   Problem Relation Age of Onset    High Cholesterol Mother     Diabetes Father     Cancer Sister     Diabetes Maternal Grandmother     Dementia Paternal Grandmother     Heart Disease Paternal Grandfather     Heart Attack Paternal Grandfather        No Known Allergies    Current Outpatient Medications   Medication Sig Dispense Refill    glyBURIDE-metFORMIN (GLUCOVANCE) 2.5-500 mg per tablet Take one tablet twice daily with meals 90 Tab 1    glucose blood VI test strips (ASCENSIA AUTODISC VI, ONE TOUCH ULTRA TEST VI) strip Check blood sugar three times a day after each meal 100 Strip 5    Blood-Glucose Meter monitoring kit Check blood sugar three times a day after meals 1 Kit 0    lancets misc Check blood sugar three times a day after each meal 100 Each 0    aspirin delayed-release 81 mg tablet Take  by mouth daily.  simvastatin (ZOCOR) 20 mg tablet Take 1 Tab by mouth nightly. 30 Tab 3           Advance Care Planning:   Patient was offered the opportunity to discuss advance care planning NO   Does patient have an Advance Directive: If no, did you provide information on Caring Connections? Patient Care Team:  Patient Care Team:  Clifford Keller NP as PCP - General (Nurse Practitioner)        LABS:    Results for orders placed or performed in visit on 04/05/19   AMB POC HEMOGLOBIN A1C   Result Value Ref Range    Hemoglobin A1c (POC) 6.7 %       Lab Results   Component Value Date/Time    WBC 6.3 01/12/2019 10:01 AM    HGB 16.5 (H) 01/12/2019 10:01 AM    HCT 46.5 01/12/2019 10:01 AM    PLATELET 369 89/53/5523 10:01 AM    MCV 84.4 01/12/2019 10:01 AM     Lab Results   Component Value Date/Time    Sodium 139 01/12/2019 10:01 AM    Potassium 4.6 01/12/2019 10:01 AM    Chloride 104 01/12/2019 10:01 AM    CO2 29 01/12/2019 10:01 AM    Anion gap 6 01/12/2019 10:01 AM    Glucose 250 (H) 01/12/2019 10:01 AM    BUN 19 (H) 01/12/2019 10:01 AM    Creatinine 0.93 01/12/2019 10:01 AM    BUN/Creatinine ratio 20 01/12/2019 10:01 AM    GFR est AA >60 01/12/2019 10:01 AM    GFR est non-AA >60 01/12/2019 10:01 AM    Calcium 8.8 01/12/2019 10:01 AM    Bilirubin, total 0.9 01/12/2019 10:01 AM    AST (SGOT) 22 01/12/2019 10:01 AM    Alk.  phosphatase 87 01/12/2019 10:01 AM    Protein, total 6.9 01/12/2019 10:01 AM    Albumin 4.2 01/12/2019 10:01 AM    Globulin 2.7 01/12/2019 10:01 AM    A-G Ratio 1.6 01/12/2019 10:01 AM    ALT (SGPT) 50 01/12/2019 10:01 AM     Lab Results   Component Value Date/Time    Hemoglobin A1c 13.3 (H) 01/12/2019 10:01 AM     Lab Results   Component Value Date/Time    Cholesterol, total 216 (H) 01/12/2019 10:01 AM    HDL Cholesterol 39 (L) 01/12/2019 10:01 AM    LDL, calculated 155.4 (H) 01/12/2019 10:01 AM    VLDL, calculated 21.6 01/12/2019 10:01 AM    Triglyceride 108 01/12/2019 10:01 AM    CHOL/HDL Ratio 5.5 (H) 01/12/2019 10:01 AM     Lab Results   Component Value Date/Time    Vitamin D 25-Hydroxy 24.2 (L) 01/12/2019 10:01 AM       Lab Results   Component Value Date/Time    TSH 1.03 01/12/2019 10:01 AM         RADIOLOGY:    ABDOMEN FLAT/UPRIGHT W/ PA [address]  Result Impression     Diffuse colonic stool burden with no high-grade bowel obstruction or free intraperitoneal air identified. CT CTA CHEST [id number]/6/2017  Cubicle  Result Impression   IMPRESSION:    1.  No evidence of pulmonary embolism. 2.   No active pulmonary disease. EKG 12 LEAD UNIT [id number]/6/2017  Cubicle  Component Name Value Ref Range   Heart Rate 73 bpm   RR Interval 822 ms   Atrial Rate 74 ms   P-R Interval 128 ms   P Duration 114 ms   P Horizontal Axis 18 deg   P Front Axis 35 deg   Q Onset 503 ms   QRSD Interval 92 ms   QT Interval 381 ms   QTcB 420 ms   QTcF 407 ms   QRS Horizontal Axis 22 deg   QRS Axis 56 deg   I-40 Front Axis 2 deg   t-40 Horizontal Axis -10 deg   T-40 Front Axis 73 deg   T Horizontal Axis 70 deg   T Wave Axis 5 deg   S-T Horizontal Axis 73 deg   S-T Front Axis 46 deg   Impression - OTHERWISE NORMAL ECG -     Impression SR-Sinus rhythm-normal P axis, V-rate 50-99     Impression RSR1-RSR' in V1 or V2, probably normal variant-small R' only     Impression -nsr, r 73, nl axis, ns changes-            Physical Exam   Constitutional: He is oriented to person, place, and time. He appears well-developed and well-nourished. No distress. HENT:   Head: Normocephalic. Eyes: Pupils are equal, round, and reactive to light. EOM are normal.   Neck: Normal range of motion. Neck supple. No thyromegaly present. Cardiovascular: Normal rate, regular rhythm and normal heart sounds. No murmur heard. Pulmonary/Chest: Effort normal and breath sounds normal. No respiratory distress. Abdominal: Soft. Bowel sounds are normal. There is no tenderness. Musculoskeletal: Normal range of motion. He exhibits no edema. Neurological: He is alert and oriented to person, place, and time. No cranial nerve deficit. He exhibits normal muscle tone. Coordination and gait normal. GCS eye subscore is 4. GCS verbal subscore is 5. GCS motor subscore is 6.    Skin: Skin is warm and dry.   Psychiatric: His speech is normal and behavior is normal. Judgment normal. His mood appears not anxious. He is not hyperactive, not withdrawn and not actively hallucinating. Cognition and memory are normal. He does not express impulsivity. He does not exhibit a depressed mood. He expresses no homicidal and no suicidal ideation. Vitals:    04/05/19 1052   BP: 127/89   Pulse: 79   Resp: 20   Temp: 96 °F (35.6 °C)   SpO2: 97%   Weight: 173 lb (78.5 kg)   Height: 5' 7\" (1.702 m)   PainSc:   0 - No pain         Assessment and Plan    Essential hypertension  *Controlled. Address PRN. Mixed hyperlipidemia  *Will switch to Crestor so that high-intensity can be reached. Check lipids in about 1-2 monhts.   - rosuvastatin (CRESTOR) 10 mg tablet; Take 1 Tab by mouth nightly. Dispense: 90 Tab; Refill: 1  - LIPID PANEL; Future    Type 2 diabetes mellitus with complication, without long-term current use of insulin ([facility] [address]  *Commended patient on improvement in A1c. Continue Glucovance. Can reduce checking sugars to a few times per week. Check labs in 1-2 months. - glyBURIDE-metFORMIN (GLUCOVANCE) 2.5-500 mg per tablet; Take 1 Tab by mouth two (2) times daily (with meals). Dispense: 180 Tab; Refill: 1  - CBC W/O DIFF; Future  - METABOLIC PANEL, COMPREHENSIVE; Future  - AMB POC HEMOGLOBIN A1C  - MICROALBUMIN, SEMIQUANTITATIVE; Future    Insomnia, unspecified type  *Discussed options, as well as sleep hygiene. He opts for Elavil. Patient has been instructed to call or MyChart message about his sleep habits with trial of medication in a few weeks. - amitriptyline (ELAVIL) 10 mg tablet; Take 1 Tab by mouth nightly. Dispense: 30 Tab; Refill: 0    Fatigue, unspecified type  *Trial Elavil to improve sleep. Check Vit D. TSH normal recently. Nonintractable episodic headache, unspecified headache type  *He will use Tylenol or Motrin PRN for abortive therapy. Advised that Elavil may be helpful at reducing headaches. Advised that headaches may also be coming from lack of sleep. Vitamin D deficiency  *Not on supplement at this time but states he may  his OTC version again. Check Vit D in about 1-2 months.   - VITAMIN D, 25 HYDROXY; Future        *Plan of care reviewed with patient. Patient in agreement with plan and expresses understanding. All questions answered and patient encouraged to call or RTO if further questions or concerns. Follow-up and Dispositions    · Return in about 3 months (around 7/5/2019) for chronic disease routine care- 15 min. Labs before next visit. Genaro Jaquez

## 2019-04-29 ENCOUNTER — TELEPHONE (OUTPATIENT)
Dept: FAMILY MEDICINE CLINIC | Age: 34
End: 2019-04-29

## 2019-04-29 NOTE — TELEPHONE ENCOUNTER
Fax requesting 90 day supply    Amitriptyline HCL 10mg tab  90  Take 1 tab by mouth every day at night

## 2019-04-30 ENCOUNTER — TELEPHONE (OUTPATIENT)
Dept: FAMILY MEDICINE CLINIC | Age: 34
End: 2019-04-30

## 2019-04-30 NOTE — TELEPHONE ENCOUNTER
4/30/2019  3:25 PM    Chief Complaint   Patient presents with    Medication Refill       Noted pharmacy request for 90 day fill on Amitriptyline. New medication and this is not appropriate until assessed on effectiveness.

## 2019-04-30 NOTE — TELEPHONE ENCOUNTER
90 Day Supply : Request for authorization    AMITRIPTYLINE HCL 10 MG TAB    QTY; 90 DAY    TAKE 1 TABLET BY MOUTH EVERY DAY AT NIGHT

## 2019-05-08 ENCOUNTER — TELEPHONE (OUTPATIENT)
Dept: FAMILY MEDICINE CLINIC | Age: 34
End: 2019-05-08

## 2019-05-08 NOTE — TELEPHONE ENCOUNTER
90 DAY SUPPLY: REQUEST FOR AUTHORIZATION    AMITRIPTYLIN TAB 10MG    TAKE 1 TABLET BY MOUTH EVERY DAY AT NIGHT

## 2019-05-28 ENCOUNTER — PATIENT MESSAGE (OUTPATIENT)
Dept: FAMILY MEDICINE CLINIC | Age: 34
End: 2019-05-28

## 2019-05-28 DIAGNOSIS — G47.00 INSOMNIA, UNSPECIFIED TYPE: ICD-10-CM

## 2019-06-04 RX ORDER — AMITRIPTYLINE HYDROCHLORIDE 25 MG/1
25 TABLET, FILM COATED ORAL
Qty: 30 TAB | Refills: 0 | Status: SHIPPED | OUTPATIENT
Start: 2019-06-04 | End: 2019-07-09 | Stop reason: SDUPTHER

## 2019-06-04 NOTE — TELEPHONE ENCOUNTER
From: Jabier Gabriel  To: Vesna Bradley NP  Sent: 5/28/2019 7:27 PM EDT  Subject: Non-Urgent Medical Question    Eddie Muro,     I keep forgetting to message you and tell you how the amitriptyline worked. I did notice I didn't wake up as often during the night, but I still would on occasion. I didn't wake up with headaches anymore. It didn't help me fall asleep, and I still woke up fatigued.

## 2019-06-04 NOTE — TELEPHONE ENCOUNTER
6/4/2019  3:20 PM    Chief Complaint   Patient presents with    Medication Evaluation       Message received from patient about Elavil- not effective at 10 mg dosing. He is agreeable to increasing to 25 mg daily. MyChart communication.

## 2019-07-08 DIAGNOSIS — G47.00 INSOMNIA, UNSPECIFIED TYPE: ICD-10-CM

## 2019-07-08 NOTE — TELEPHONE ENCOUNTER
[facility] PHARM[facility]Y    90 DAYS SUPPLY: REQUEST FOR AUTHORIZATION    AMITRIPTYLINE HCL 25 MG TAB    QTY: 90.0    TAKE 1 TABLET BY MOUTH EVERY DAY AT NIGHT

## 2019-07-10 RX ORDER — AMITRIPTYLINE HYDROCHLORIDE 25 MG/1
25 TABLET, FILM COATED ORAL
Qty: 30 TAB | Refills: 0 | Status: SHIPPED | OUTPATIENT
Start: 2019-07-10 | End: 2019-09-19

## 2019-08-14 ENCOUNTER — TELEPHONE (OUTPATIENT)
Dept: FAMILY MEDICINE CLINIC | Age: 34
End: 2019-08-14

## 2019-08-14 DIAGNOSIS — E11.65 UNCONTROLLED TYPE 2 DIABETES MELLITUS WITH HYPERGLYCEMIA (HCC): ICD-10-CM

## 2019-09-13 ENCOUNTER — OFFICE VISIT (OUTPATIENT)
Dept: FAMILY MEDICINE CLINIC | Age: 34
End: 2019-09-13

## 2019-09-13 VITALS
TEMPERATURE: 97.9 F | HEART RATE: 87 BPM | SYSTOLIC BLOOD PRESSURE: 135 MMHG | HEIGHT: 67 IN | RESPIRATION RATE: 20 BRPM | OXYGEN SATURATION: 97 % | WEIGHT: 181 LBS | BODY MASS INDEX: 28.41 KG/M2 | DIASTOLIC BLOOD PRESSURE: 91 MMHG

## 2019-09-13 DIAGNOSIS — Z23 ENCOUNTER FOR IMMUNIZATION: ICD-10-CM

## 2019-09-13 DIAGNOSIS — E78.2 MIXED HYPERLIPIDEMIA: ICD-10-CM

## 2019-09-13 DIAGNOSIS — I10 ESSENTIAL HYPERTENSION: Primary | ICD-10-CM

## 2019-09-13 DIAGNOSIS — E11.9 TYPE 2 DIABETES MELLITUS WITHOUT COMPLICATION, WITHOUT LONG-TERM CURRENT USE OF INSULIN (HCC): ICD-10-CM

## 2019-09-13 RX ORDER — ROSUVASTATIN CALCIUM 10 MG/1
10 TABLET, COATED ORAL
Qty: 90 TAB | Refills: 1 | Status: SHIPPED | OUTPATIENT
Start: 2019-09-13 | End: 2020-04-10 | Stop reason: SDUPTHER

## 2019-09-13 RX ORDER — GLYBURIDE-METFORMIN HYDROCHLORIDE 2.5; 5 MG/1; MG/1
1 TABLET ORAL 2 TIMES DAILY WITH MEALS
Qty: 180 TAB | Refills: 1 | Status: SHIPPED | OUTPATIENT
Start: 2019-09-13 | End: 2020-07-07 | Stop reason: DRUGHIGH

## 2019-09-13 NOTE — PROGRESS NOTES
Miguel Green is a 37 y.o. male who presents for routine immunizations. He denies any symptoms , reactions or allergies that would exclude them from being immunized today. Risks and adverse reactions were discussed and the VIS was given to them. All questions were addressed. He was observed for 15 min post injection. There were no reactions observed.     Freddy San LPN

## 2019-09-13 NOTE — PROGRESS NOTES
OFFICE NOTE    Umesh Taylor is a 37 y.o. male presenting today for office visit. 9/13/2019  5:02 PM    Chief Complaint   Patient presents with    Follow Up Chronic Condition       HPI: Here today for follow up on chronic conditions. Last routine labs completed 1/2019. Not following with any specialists. HTN/Hyperlipidemia: Reports being diagnosed with HTN in the past- had been intermittently on medication but most recently has not needed. Last  1/2019- was started on cholesterol medication at that time but then ran out- restarted back on statin a few months ago. EKG 10/2017 NSR. Diabetes: Newly diagnosed 1/2019 with A1c of 13.3%. Started on Glyburide/Metformin at that time- has been taking BID. He states this sugars have been running pretty good at home- AM readings seem to be the highest of around 150-160 and lunch is the lowest around 100. He denies any SE of medication. A1c 6.7% 4/2019. Review of Systems   Constitutional: Negative for chills, fatigue and fever. Respiratory: Negative for cough, shortness of breath and wheezing. Cardiovascular: Negative for chest pain, palpitations and leg swelling. Gastrointestinal: Negative for abdominal pain, constipation, diarrhea, nausea and vomiting. Genitourinary: Negative for difficulty urinating and frequency. Musculoskeletal: Negative for arthralgias and myalgias. Skin: Negative for rash. Neurological: Negative for dizziness and headaches.          PHQ Screening   3 most recent PHQ Screens 4/5/2019   Little interest or pleasure in doing things Not at all   Feeling down, depressed, irritable, or hopeless Not at all   Total Score PHQ 2 0   Trouble falling or staying asleep, or sleeping too much -   Feeling tired or having little energy -   Poor appetite, weight loss, or overeating -   Feeling bad about yourself - or that you are a failure or have let yourself or your family down -   Trouble concentrating on things such as school, work, reading, or watching TV -   Moving or speaking so slowly that other people could have noticed; or the opposite being so fidgety that others notice -   Thoughts of being better off dead, or hurting yourself in some way -   PHQ 9 Score -   How difficult have these problems made it for you to do your work, take care of your home and get along with others -         History  Past Medical History:   Diagnosis Date    Diabetes ([facility] [address] 01/2019    Hyperlipidemia 01/2019    Hypertension     intermittently for a few years    Irritable bowel syndrome        Past Surgical History:   Procedure Laterality Date    HX ORTHOPAEDIC      hammer toe surgery    HX WISDOM TEETH EXTRACTION         Social History     Socioeconomic History    Marital status:      Spouse name: Not on file    Number of children: Not on file    Years of education: Not on file    Highest education level: Not on file   Occupational History    Not on file   Social Needs    Financial resource strain: Not on file    Food insecurity:     Worry: Not on file     Inability: Not on file    Transportation needs:     Medical: Not on file     Non-medical: Not on file   Tobacco Use    Smoking status: Current Some Day Smoker     Types: Cigars    Smokeless tobacco: Current User     Types: Chew   Substance and Sexual Activity    Alcohol use: Yes     Comment: occasional    Drug use: No    Sexual activity: Not on file   Lifestyle    Physical activity:     Days per week: Not on file     Minutes per session: Not on file    Stress: Not on file   Relationships    Social connections:     Talks on phone: Not on file     Gets together: Not on file     Attends [employer] service: Not on file     Active member of club or organization: Not on file     Attends meetings of clubs or organizations: Not on file     Relationship status: Not on file    Intimate partner violence:     Fear of current or ex partner: Not on file     Emotionally abused: Not on file Physically abused: Not on file     Forced sexual activity: Not on file   Other Topics Concern    Not on file   Social History Narrative    Not on file       Family History   Problem Relation Age of Onset    High Cholesterol Mother     Diabetes Father     Cancer Sister     Diabetes Maternal Grandmother     Dementia Paternal Grandmother     Heart Disease Paternal Grandfather     Heart Attack Paternal Grandfather        No Known Allergies    Current Outpatient Medications   Medication Sig Dispense Refill    glucose blood VI test strips (ASCENSIA AUTODISC VI, ONE TOUCH ULTRA TEST VI) strip Check blood sugar three times a day after each meal *ONE TOUCH ULTRA BLUE* 100 Strip 5    rosuvastatin (CRESTOR) 10 mg tablet Take 1 Tab by mouth nightly. 90 Tab 1    glyBURIDE-metFORMIN (GLUCOVANCE) 2.5-500 mg per tablet Take 1 Tab by mouth two (2) times daily (with meals). 180 Tab 1    Blood-Glucose Meter monitoring kit Check blood sugar three times a day after meals 1 Kit 0    lancets misc Check blood sugar three times a day after each meal 100 Each 0    aspirin delayed-release 81 mg tablet Take  by mouth daily.  amitriptyline (ELAVIL) 25 mg tablet Take 1 Tab by mouth nightly. 30 Tab 0           Advance Care Planning:   Patient was offered the opportunity to discuss advance care planning NO   Does patient have an Advance Directive: If no, did you provide information on Caring Connections? Patient Care Team:  Patient Care Team:  Lisbeth Santacruz NP as PCP - General (Nurse Practitioner)        LABS:  None new to review    RADIOLOGY:  None new to review      Physical Exam   Constitutional: He is oriented to person, place, and time. He appears well-developed and well-nourished. No distress. Neck: Normal range of motion. Neck supple. No thyromegaly present. Cardiovascular: Normal rate, regular rhythm and normal heart sounds. No murmur heard.   Pulmonary/Chest: Effort normal and breath sounds normal. No respiratory distress. Abdominal: Soft. Bowel sounds are normal. There is no tenderness. Musculoskeletal: He exhibits no edema. Neurological: He is alert and oriented to person, place, and time. He exhibits normal muscle tone. Coordination and gait normal.   Skin: Skin is warm and dry. Vitals:    09/13/19 1651 09/13/19 1654   BP: (!) 136/93 (!) 135/91   Pulse: 87    Resp: 20    Temp: 97.9 °F (36.6 °C)    TempSrc: Oral    SpO2: 97%    Weight: 181 lb (82.1 kg)    Height: 5' 7\" (1.702 m)    PainSc:   0 - No pain        BP Readings from Last 3 Encounters:   09/13/19 (!) 135/91   04/05/19 127/89   02/08/19 123/90       Assessment and Plan    Essential hypertension  *Slightly high DBP today. Will continue to monitor. DASH diet encouraged    Mixed hyperlipidemia  *Continue statin. Recheck lipids  - LIPID PANEL; Future  - rosuvastatin (CRESTOR) 10 mg tablet; Take 1 Tab by mouth nightly. Dispense: 90 Tab; Refill: 1    Type 2 diabetes mellitus without complication, without long-term current use of insulin ([address]  *Recheck routine labs. Continue Glucovance. - CBC W/O DIFF; Future  - METABOLIC PANEL, COMPREHENSIVE; Future  - HEMOGLOBIN A1C WITH EAG; Future  - MICROALBUMIN, UR, RAND W/ MICROALB/CREAT RATIO; Future  - URINALYSIS W/MICROSCOPIC; Future  - TSH 3RD GENERATION; Future  - glyBURIDE-metFORMIN (GLUCOVANCE) 2.5-500 mg per tablet; Take 1 Tab by mouth two (2) times daily (with meals). Dispense: 180 Tab; Refill: 1    Encounter for immunization  - INFLUENZA VIRUS VAC QUAD,SPLIT,PRESV FREE SYRINGE IM  - [place] IMMUNIZ ADMIN,1 SINGLE/COMB VAC/TOXOID        *Plan of care reviewed with patient. Patient in agreement with plan and expresses understanding. All questions answered and patient encouraged to call or RTO if further questions or concerns. Follow-up and Dispositions    · Return in about 6 months (around 3/13/2020) for chronic disease routine care- 15 min.

## 2019-10-01 ENCOUNTER — HOSPITAL ENCOUNTER (OUTPATIENT)
Dept: LAB | Age: 34
Discharge: HOME | End: 2019-10-01
Payer: COMMERCIAL

## 2019-10-01 ENCOUNTER — LAB ONLY (OUTPATIENT)
Dept: FAMILY MEDICINE CLINIC | Age: 34
End: 2019-10-01

## 2019-10-01 DIAGNOSIS — Z01.89 ENCOUNTER FOR LABORATORY TEST: Primary | ICD-10-CM

## 2019-10-01 DIAGNOSIS — E78.2 MIXED HYPERLIPIDEMIA: ICD-10-CM

## 2019-10-01 DIAGNOSIS — E11.9 TYPE 2 DIABETES MELLITUS WITHOUT COMPLICATION, WITHOUT LONG-TERM CURRENT USE OF INSULIN (HCC): ICD-10-CM

## 2019-10-01 LAB
ALBUMIN SERPL-MCNC: 4.3 G/DL (ref 3.4–5)
ALBUMIN/GLOB SERPL: 1.5 {RATIO} (ref 0.8–1.7)
ALP SERPL-CCNC: 75 U/L (ref 45–117)
ALT SERPL-CCNC: 52 U/L (ref 16–61)
AMORPH CRY URNS QL MICRO: ABNORMAL
ANION GAP SERPL CALC-SCNC: 6 MMOL/L (ref 3–18)
APPEARANCE UR: ABNORMAL
AST SERPL-CCNC: 23 U/L (ref 10–38)
BACTERIA URNS QL MICRO: NEGATIVE /HPF
BILIRUB SERPL-MCNC: 0.6 MG/DL (ref 0.2–1)
BILIRUB UR QL: NEGATIVE
BUN SERPL-MCNC: 17 MG/DL (ref 7–18)
BUN/CREAT SERPL: 16 (ref 12–20)
CALCIUM SERPL-MCNC: 9.1 MG/DL (ref 8.5–10.1)
CHLORIDE SERPL-SCNC: 106 MMOL/L (ref 100–111)
CO2 SERPL-SCNC: 29 MMOL/L (ref 21–32)
COLOR UR: ABNORMAL
CREAT SERPL-MCNC: 1.06 MG/DL (ref 0.6–1.3)
EPITH CASTS URNS QL MICRO: ABNORMAL /LPF (ref 0–5)
ERYTHROCYTE [DISTWIDTH] IN BLOOD BY AUTOMATED COUNT: 12.6 % (ref 11.6–14.5)
EST. AVERAGE GLUCOSE BLD GHB EST-MCNC: 163 MG/DL
GLOBULIN SER CALC-MCNC: 2.9 G/DL (ref 2–4)
GLUCOSE SERPL-MCNC: 183 MG/DL (ref 74–99)
GLUCOSE UR STRIP.AUTO-MCNC: 250 MG/DL
HBA1C MFR BLD: 7.3 % (ref 4.2–5.6)
HCT VFR BLD AUTO: 48.9 % (ref 36–48)
HGB BLD-MCNC: 16.3 G/DL (ref 13–16)
HGB UR QL STRIP: NEGATIVE
KETONES UR QL STRIP.AUTO: NEGATIVE MG/DL
LEUKOCYTE ESTERASE UR QL STRIP.AUTO: NEGATIVE
MCH RBC QN AUTO: 30.1 PG (ref 24–34)
MCHC RBC AUTO-ENTMCNC: 33.3 G/DL (ref 31–37)
MCV RBC AUTO: 90.2 FL (ref 74–97)
NITRITE UR QL STRIP.AUTO: NEGATIVE
PH UR STRIP: 5 [PH] (ref 5–8)
PLATELET # BLD AUTO: 277 K/UL (ref 135–420)
PMV BLD AUTO: 11.7 FL (ref 9.2–11.8)
POTASSIUM SERPL-SCNC: 4.7 MMOL/L (ref 3.5–5.5)
PROT SERPL-MCNC: 7.2 G/DL (ref 6.4–8.2)
PROT UR STRIP-MCNC: NEGATIVE MG/DL
RBC # BLD AUTO: 5.42 M/UL (ref 4.7–5.5)
RBC #/AREA URNS HPF: 0 /HPF (ref 0–5)
SODIUM SERPL-SCNC: 141 MMOL/L (ref 136–145)
SP GR UR REFRACTOMETRY: 1.03 (ref 1–1.03)
TSH SERPL DL<=0.05 MIU/L-ACNC: 1.29 UIU/ML (ref 0.36–3.74)
UROBILINOGEN UR QL STRIP.AUTO: 1 EU/DL (ref 0.2–1)
WBC # BLD AUTO: 8.4 K/UL (ref 4.6–13.2)
WBC URNS QL MICRO: ABNORMAL /HPF (ref 0–4)

## 2019-10-01 PROCEDURE — 82043 UR ALBUMIN QUANTITATIVE: CPT

## 2019-10-01 PROCEDURE — 80061 LIPID PANEL: CPT

## 2019-10-01 PROCEDURE — 80053 COMPREHEN METABOLIC PANEL: CPT

## 2019-10-01 PROCEDURE — 84443 ASSAY THYROID STIM HORMONE: CPT

## 2019-10-01 PROCEDURE — 81001 URINALYSIS AUTO W/SCOPE: CPT

## 2019-10-01 PROCEDURE — 85027 COMPLETE CBC AUTOMATED: CPT

## 2019-10-01 PROCEDURE — 83036 HEMOGLOBIN GLYCOSYLATED A1C: CPT

## 2019-10-01 NOTE — PROGRESS NOTES
Patient here at this time for laboratory visit at this time. Venipuncture to left forearm at this time. Patient tolerated well. Lab ordered  by PCP GERMAN ARROYO.  No other concerns voiced

## 2019-10-02 LAB
CHOLEST SERPL-MCNC: 109 MG/DL
CREAT UR-MCNC: 296 MG/DL (ref 30–125)
HDLC SERPL-MCNC: 37 MG/DL (ref 40–60)
HDLC SERPL: 2.9 {RATIO} (ref 0–5)
LDLC SERPL CALC-MCNC: 56.4 MG/DL (ref 0–100)
LIPID PROFILE,FLP: ABNORMAL
MICROALBUMIN UR-MCNC: 2.58 MG/DL (ref 0–3)
MICROALBUMIN/CREAT UR-RTO: 9 MG/G (ref 0–30)
TRIGL SERPL-MCNC: 78 MG/DL (ref ?–150)
VLDLC SERPL CALC-MCNC: 15.6 MG/DL

## 2020-04-09 ENCOUNTER — TELEPHONE (OUTPATIENT)
Dept: FAMILY MEDICINE CLINIC | Age: 35
End: 2020-04-09

## 2020-04-09 NOTE — TELEPHONE ENCOUNTER
Pt has a form that needs to be filled out regarding his underline health issues for the COVID 19  [id number]

## 2020-04-10 ENCOUNTER — TELEPHONE (OUTPATIENT)
Dept: FAMILY MEDICINE CLINIC | Age: 35
End: 2020-04-10

## 2020-04-10 ENCOUNTER — VIRTUAL VISIT (OUTPATIENT)
Dept: FAMILY MEDICINE CLINIC | Age: 35
End: 2020-04-10

## 2020-04-10 VITALS
HEIGHT: 67 IN | DIASTOLIC BLOOD PRESSURE: 73 MMHG | SYSTOLIC BLOOD PRESSURE: 137 MMHG | BODY MASS INDEX: 27 KG/M2 | WEIGHT: 172 LBS

## 2020-04-10 DIAGNOSIS — E11.9 TYPE 2 DIABETES MELLITUS WITHOUT COMPLICATION, WITHOUT LONG-TERM CURRENT USE OF INSULIN (HCC): ICD-10-CM

## 2020-04-10 DIAGNOSIS — I10 ESSENTIAL HYPERTENSION: ICD-10-CM

## 2020-04-10 DIAGNOSIS — E78.2 MIXED HYPERLIPIDEMIA: Primary | ICD-10-CM

## 2020-04-10 RX ORDER — ROSUVASTATIN CALCIUM 10 MG/1
10 TABLET, COATED ORAL
Qty: 90 TAB | Refills: 3 | Status: SHIPPED | OUTPATIENT
Start: 2020-04-10 | End: 2020-07-07 | Stop reason: SDUPTHER

## 2020-04-10 NOTE — Clinical Note
[place], please help this patient get scheduled in for follow up in 3 months for diabetes monitoring, we can do point of care A1c at that visit. Thanks!

## 2020-04-10 NOTE — PROGRESS NOTES
Telemedicine Virtual Visit SOAP note    Vikram Brown is a 37 y.o. male who was seen by synchronous (real-time) audio-video technology on 4/10/2020. Consent:  He and/or his healthcare decision maker is aware that this patient-initiated Telehealth encounter is a billable service, with coverage as determined by his insurance carrier. He is aware that he may receive a bill and has provided verbal consent to proceed: Yes    This visit was conducted via [facility]    Subjective-    Vikram Brown was seen for Follow-up; Hypertension; Hyperlipidemia; and Diabetes  This is a former patient of Sheryle Caroline NP, here today for follow up on chronic conditions as well as refill medications. HTN/Hyperlipidemia: Reports being diagnosed with HTN in the past- had been intermittently on medication but most recently has not needed it. He monitors BP at home and reports good control, 543'T systolic, 74J diastolic.  1/2019- was started on cholesterol medication at that time. Last LDL improved to 56.4 in 10/2019. He ran out of statin in February, will refill now. EKG 10/2017 NSR.        Key CAD CHF Meds             rosuvastatin (CRESTOR) 10 mg tablet Take 1 Tab by mouth nightly. aspirin delayed-release 81 mg tablet Take  by mouth daily. Diabetes: Newly diagnosed 1/2019 with A1c of 13.3%. Started on Glyburide/Metformin at that time- has been taking BID. Initially A1c decreased to 6.7%. He states his sugars have been running pretty good at home but are seeming to fluctuate. He states usually 160s-190s in the morning, but he checks after eating, not fasting. He states around 150 at lunch. He denies episodes of hypoglycemia. He has been making diet and lifestyle intensifications recently as he is out of work on leave due to [place] 19 and is able to- he is walking about 2.5 miles per day. Diet is more based on fruits and veggies, limiting carbs and starches, decreasing snacks/cookies.  Also now doing stevia instead of regular sugar. States he has lost some weight, about 8 lbs since last visit. Follows with eye doctor and [facility] in [place], will request records for health maintenance. He is due for eye exam but clinic currently closed due to COVID 19. He is due for foot exam but denies issues related to feet at the moment. Discussed PPSV due to diabetes, agreeable to immunization at next in office appointment. Key Antihyperglycemic Medications             glyBURIDE-metFORMIN (GLUCOVANCE) 2.5-500 mg per tablet Take 1 Tab by mouth two (2) times daily (with meals). Lab Results   Component Value Date/Time    Hemoglobin A1c 7.3 (H) 10/01/2019 07:41 AM    Hemoglobin A1c (POC) 6.7 04/05/2019 11:26 AM           Current Medications-    Current Outpatient Medications:     rosuvastatin (CRESTOR) 10 mg tablet, Take 1 Tab by mouth nightly., Disp: 90 Tab, Rfl: 1    glyBURIDE-metFORMIN (GLUCOVANCE) 2.5-500 mg per tablet, Take 1 Tab by mouth two (2) times daily (with meals). , Disp: 180 Tab, Rfl: 1    glucose blood VI test strips (ASCENSIA AUTODISC VI, ONE TOUCH ULTRA TEST VI) strip, Check blood sugar three times a day after each meal *ONE TOUCH ULTRA BLUE*, Disp: 100 Strip, Rfl: 5    Blood-Glucose Meter monitoring kit, Check blood sugar three times a day after meals, Disp: 1 Kit, Rfl: 0    lancets misc, Check blood sugar three times a day after each meal, Disp: 100 Each, Rfl: 0    aspirin delayed-release 81 mg tablet, Take  by mouth daily. , Disp: , Rfl:     Allergies-  No Known Allergies    Review of Systems-  Review of Systems   Constitutional: Negative for chills, fatigue and fever. Eyes: Negative for visual disturbance. Respiratory: Negative for cough, shortness of breath and wheezing. Cardiovascular: Negative for chest pain and palpitations. Gastrointestinal: Negative for abdominal pain, constipation, diarrhea, nausea and vomiting.    Endocrine: Negative for polydipsia, polyphagia and polyuria. Musculoskeletal: Negative for arthralgias. Neurological: Negative for dizziness, speech difficulty, weakness and headaches. Psychiatric/Behavioral: Negative for dysphoric mood. Patient Care Team-  Patient Care Team:  Renny Lagunas NP as PCP - General (Nurse Practitioner)  Tiffani Salinas NP as PCP - [facility] Empaneled Provider      Objective-    PHYSICAL EXAMINATION:    Vital Signs: (As obtained by patient/caregiver at home)  Visit Vitals  /73   Ht 5' 7\" (1.702 m)   Wt 172 lb (78 kg)   BMI 26.94 kg/m²        Constitutional: [x] Appears well-developed and well-nourished [x] No apparent distress          Mental status: [x] Alert and awake  [x] Oriented to person/place/time [x] Able to follow commands    [] Abnormal -     Eyes:   EOM    [x]  Normal    [] Abnormal -   Sclera  [x]  Normal    [] Abnormal -          Discharge [x]  None visible       HENT: [x] Normocephalic, atraumatic    [x] Mouth/Throat: Mucous membranes are moist    External Ears [x] Normal  [] Abnormal -    Neck: [x] No visualized mass [] Abnormal -     Pulmonary/Chest: [x] Respiratory effort normal   [x] No visualized signs of difficulty breathing or respiratory distress       Musculoskeletal:   [x] Normal gait with no signs of ataxia         [x] Normal range of motion of neck            Neurological:        [x] No Facial Asymmetry (Cranial nerve 7 motor function) (limited exam due to video visit)          [x] No gaze palsy               Skin:        [x] No significant exanthematous lesions or discoloration noted on facial skin               Psychiatric:       [x] Normal Affect         Assessment  & Plan    1. Essential hypertension  Patient reports good control, continue to monitor. No need to initiate therapy at this point. 2. Mixed hyperlipidemia  Will refill crestor     3.  Type 2 diabetes mellitus without complication, without long-term current use of insulin (HCC)  Discussed option to titrate up on medication or to intensify lifestyle- patient will intensify lifestyle and we will follow up with A1c check in 3 months. We discussed the expected course, resolution and complications of the diagnosis(es) in detail. Medication risks, benefits, costs, interactions, and alternatives were discussed as indicated. I advised him to contact the office if his condition worsens, changes or fails to improve as anticipated. He expressed understanding with the diagnosis(es) and plan. Ariella Puri is a 37 y.o. male being evaluated by a video visit encounter for concerns as above. A caregiver was present when appropriate. Due to this being a TeleHealth encounter (During [facility] public health emergency), evaluation of the following organ systems was limited: Vitals/Constitutional/EENT/Resp/CV/GI//MS/Neuro/Skin/Heme-Lymph-Imm. Pursuant to the emergency declaration under the [address] [place] [address], 1135 waiver authority and the [employer] and [employer] Act, this Virtual  Visit was conducted, with patient's (and/or legal guardian's) consent, to reduce the patient's risk of exposure to COVID-19 and provide necessary medical care. I was in the office while conducting this encounter. Patient was located at his home. Pursuant to the emergency declaration under the [facility], [address]5 waiver authority and the [employer] and [employer] Act, this Virtual  Visit was conducted, with patient's consent, to reduce the patient's risk of exposure to COVID-19 and provide continuity of care for an established patient. Services were provided through a video synchronous discussion virtually to substitute for in-person clinic visit.     I affirm this is a Patient Initiated Episode with an Established Patient who has not had a related appointment within my department in the past 7 days or scheduled within the next 24 hours.     Total Time: minutes: 11-20 minutes    Note: not billable if this call serves to triage the patient into an appointment for the relevant concern      I spent at least 15 minutes with this established patient, and >50% of the time was spent counseling and/or coordinating care regarding HTN, HLD, DM    Macy Gallagher NP  04/10/20

## 2020-04-10 NOTE — TELEPHONE ENCOUNTER
Spoke with patient and scheduled follow up appointment in 3 months with Baljit Scott, NP-Diabetes Care/A1c (poc)

## 2020-07-07 ENCOUNTER — OFFICE VISIT (OUTPATIENT)
Dept: FAMILY MEDICINE CLINIC | Age: 35
End: 2020-07-07

## 2020-07-07 ENCOUNTER — HOSPITAL ENCOUNTER (OUTPATIENT)
Dept: LAB | Age: 35
Discharge: HOME | End: 2020-07-07
Payer: COMMERCIAL

## 2020-07-07 VITALS
TEMPERATURE: 97.5 F | BODY MASS INDEX: 28.09 KG/M2 | HEART RATE: 87 BPM | WEIGHT: 179 LBS | DIASTOLIC BLOOD PRESSURE: 79 MMHG | RESPIRATION RATE: 16 BRPM | SYSTOLIC BLOOD PRESSURE: 133 MMHG | OXYGEN SATURATION: 98 % | HEIGHT: 67 IN

## 2020-07-07 DIAGNOSIS — E11.9 TYPE 2 DIABETES MELLITUS WITHOUT COMPLICATION, WITHOUT LONG-TERM CURRENT USE OF INSULIN (HCC): ICD-10-CM

## 2020-07-07 DIAGNOSIS — E78.2 MIXED HYPERLIPIDEMIA: ICD-10-CM

## 2020-07-07 DIAGNOSIS — Z01.89 ENCOUNTER FOR LABORATORY EXAMINATION: Primary | ICD-10-CM

## 2020-07-07 LAB — HBA1C MFR BLD HPLC: 9.7 %

## 2020-07-07 PROCEDURE — 80053 COMPREHEN METABOLIC PANEL: CPT

## 2020-07-07 RX ORDER — GLYBURIDE-METFORMIN HYDROCHLORIDE 5; 500 MG/1; MG/1
1 TABLET ORAL 2 TIMES DAILY WITH MEALS
Qty: 60 TAB | Refills: 1 | Status: SHIPPED | OUTPATIENT
Start: 2020-07-07 | End: 2020-07-31

## 2020-07-07 RX ORDER — GLYBURIDE-METFORMIN HYDROCHLORIDE 2.5; 5 MG/1; MG/1
1 TABLET ORAL 2 TIMES DAILY WITH MEALS
Qty: 180 TAB | Refills: 1 | Status: CANCELLED | OUTPATIENT
Start: 2020-07-07

## 2020-07-07 RX ORDER — ROSUVASTATIN CALCIUM 10 MG/1
10 TABLET, COATED ORAL
Qty: 90 TAB | Refills: 3 | Status: SHIPPED | OUTPATIENT
Start: 2020-07-07 | End: 2021-01-06 | Stop reason: SDUPTHER

## 2020-07-07 NOTE — PROGRESS NOTES
OFFICE NOTE (SOAP)      7/7/2020  2:49 PM    Chief Complaint   Patient presents with    Diabetes    Medication Refill       SUBJECTIVE:    HPI:   Miguel Zhu is a 37 y.o. male presenting today for office visit. Follow up on DM and med refill. Type II DM- Newly diagnosed 1/2019 with A1c of 13.3%. Started on Glyburide/Metformin at that time- has been taking BID. Initially A1c decreased to 6.7%. He states his sugars have been running pretty good at home but are seeming to fluctuate. Lately have been remaining increased, 160s-190s. Has been as high as 300s. A1c checked and increased today. Denies any changes to diet, has been trying to intensify. Does endorse less exercise now that gyms are closed. Denies episodes of hypoglycemia. Denies feeling symptomatic with polyuria, polydipsia, polyphagia, confusion, lethargy, abdominal pain, vision changes. 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 discussed options for management today including titration up on current meds, adding 3rd agent. He is open to both. Today we discussed titration glyburide/metformin to 5-500 BID. Intensify diet and lifestyle. Will get labs to check kidney function and if appropriate we can start SGLT2. Patient on statin medication, Crestor. Last LDL appropriate. Not on ACE/ARB. BP remains well controlled. Educated on smoking cessation, only occassional cigar use- advised complete cessation. Educated on foot care, foot exam today WNL. Educated on eye exams, follows with [facility] care, request records.      Lab Results   Component Value Date/Time    Hemoglobin A1c 7.3 (H) 10/01/2019 07:41 AM    Hemoglobin A1c (POC) 9.7 07/07/2020 03:05 AM     Lab Results   Component Value Date/Time    Cholesterol, total 109 10/01/2019 07:41 AM    HDL Cholesterol 37 (L) 10/01/2019 07:41 AM    LDL, calculated 56.4 10/01/2019 07:41 AM    VLDL, calculated 15.6 10/01/2019 07:41 AM Triglyceride 78 10/01/2019 07:41 AM    CHOL/HDL Ratio 2.9 10/01/2019 07:41 AM         Review of Systems   Constitutional: Negative for activity change, appetite change, fatigue, fever and unexpected weight change. Eyes: Negative for visual disturbance. Respiratory: Negative for cough, shortness of breath and wheezing. Cardiovascular: Negative for chest pain and palpitations. Gastrointestinal: Positive for constipation and diarrhea. Negative for abdominal pain, nausea and rectal pain. Known IBS   Endocrine: Negative for polydipsia, polyphagia and polyuria. Genitourinary: Negative for dysuria and frequency. Musculoskeletal: Negative for arthralgias. Skin: Negative for color change and rash. Neurological: Negative for dizziness, tremors, syncope, facial asymmetry, speech difficulty, weakness, light-headedness, numbness and headaches. Psychiatric/Behavioral: Negative for dysphoric mood.          PHQ Screening   3 most recent PHQ Screens 7/7/2020   Little interest or pleasure in doing things Not at all   Feeling down, depressed, irritable, or hopeless Not at all   Total Score PHQ 2 0   Trouble falling or staying asleep, or sleeping too much Not at all   Feeling tired or having little energy Not at all   Poor appetite, weight loss, or overeating Not at all   Feeling bad about yourself - or that you are a failure or have let yourself or your family down Not at all   Trouble concentrating on things such as school, work, reading, or watching TV Not at all   Moving or speaking so slowly that other people could have noticed; or the opposite being so fidgety that others notice Not at all   Thoughts of being better off dead, or hurting yourself in some way Not at all   PHQ 9 Score 0   How difficult have these problems made it for you to do your work, take care of your home and get along with others Not difficult at all         History  Past Medical History:   Diagnosis Date    Diabetes ([address] 01/2019    Hyperlipidemia 01/2019    Hypertension     intermittently for a few years    Irritable bowel syndrome        Past Surgical History:   Procedure Laterality Date    HX ORTHOPAEDIC      hammer toe surgery    HX WISDOM TEETH EXTRACTION         Social History     Socioeconomic History    Marital status:      Spouse name: Not on file    Number of children: Not on file    Years of education: Not on file    Highest education level: Not on file   Occupational History    Not on file   Social Needs    Financial resource strain: Not on file    Food insecurity     Worry: Not on file     Inability: Not on file    Transportation needs     Medical: Not on file     Non-medical: Not on file   Tobacco Use    Smoking status: Current Some Day Smoker     Types: Cigars    Smokeless tobacco: Current User     Types: Chew    Tobacco comment: maybe once per week    Substance and Sexual Activity    Alcohol use: Yes     Comment: occasional    Drug use: No    Sexual activity: Not on file   Lifestyle    Physical activity     Days per week: Not on file     Minutes per session: Not on file    Stress: Not on file   Relationships    Social connections     Talks on phone: Not on file     Gets together: Not on file     Attends Sabianist service: Not on file     Active member of club or organization: Not on file     Attends meetings of clubs or organizations: Not on file     Relationship status: Not on file    Intimate partner violence     Fear of current or ex partner: Not on file     Emotionally abused: Not on file     Physically abused: Not on file     Forced sexual activity: Not on file   Other Topics Concern    Not on file   Social History Narrative    Not on file       Family History   Problem Relation Age of Onset    High Cholesterol Mother     Diabetes Father     Cancer Sister     Diabetes Maternal Grandmother     Dementia Paternal Grandmother     Heart Disease Paternal Grandfather     Heart Attack Paternal Grandfather        No Known Allergies    Current Outpatient Medications   Medication Sig Dispense Refill    rosuvastatin (CRESTOR) 10 mg tablet Take 1 Tab by mouth nightly. 90 Tab 3    glyBURIDE-metFORMIN (GLUCOVANCE) 2.5-500 mg per tablet Take 1 Tab by mouth two (2) times daily (with meals). 180 Tab 1    glucose blood VI test strips (ASCENSIA AUTODISC VI, ONE TOUCH ULTRA TEST VI) strip Check blood sugar three times a day after each meal *ONE TOUCH ULTRA BLUE* 100 Strip 5    Blood-Glucose Meter monitoring kit Check blood sugar three times a day after meals 1 Kit 0    lancets misc Check blood sugar three times a day after each meal 100 Each 0    aspirin delayed-release 81 mg tablet Take  by mouth daily.          Patient Care Team:  Patient Care Team:  Isabella Griffith NP as PCP - General (Nurse Practitioner)  Isabella Griffith NP as PCP - [facility]      LABS:  Lab Results   Component Value Date/Time    Cholesterol, total 109 10/01/2019 07:41 AM    HDL Cholesterol 37 (L) 10/01/2019 07:41 AM    LDL, calculated 56.4 10/01/2019 07:41 AM    VLDL, calculated 15.6 10/01/2019 07:41 AM    Triglyceride 78 10/01/2019 07:41 AM    CHOL/HDL Ratio 2.9 10/01/2019 07:41 AM     Lab Results   Component Value Date/Time    WBC 8.4 10/01/2019 07:41 AM    HGB 16.3 (H) 10/01/2019 07:41 AM    HCT 48.9 (H) 10/01/2019 07:41 AM    PLATELET 074 48[birth year]1943 07:41 AM    MCV 90.2 10/01/2019 07:41 AM     Lab Results   Component Value Date/Time    Sodium 141 10/01/2019 07:41 AM    Potassium 4.7 10/01/2019 07:41 AM    Chloride 106 10/01/2019 07:41 AM    CO2 29 10/01/2019 07:41 AM    Anion gap 6 10/01/2019 07:41 AM    Glucose 183 (H) 10/01/2019 07:41 AM    BUN 17 10/01/2019 07:41 AM    Creatinine 1.06 10/01/2019 07:41 AM    BUN/Creatinine ratio 16 10/01/2019 07:41 AM    GFR est AA >60 10/01/2019 07:41 AM    GFR est non-AA >60 10/01/2019 07:41 AM    Calcium 9.1 10/01/2019 07:41 AM    Bilirubin, total 0.6 10/01/2019 07:41 AM Alk. phosphatase 75 10/01/2019 07:41 AM    Protein, total 7.2 10/01/2019 07:41 AM    Albumin 4.3 10/01/2019 07:41 AM    Globulin 2.9 10/01/2019 07:41 AM    A-G Ratio 1.5 10/01/2019 07:41 AM    ALT (SGPT) 52 10/01/2019 07:41 AM    AST (SGOT) 23 10/01/2019 07:41 AM     Lab Results   Component Value Date/Time    TSH 1.29 10/01/2019 07:41 AM     Lab Results   Component Value Date/Time    Hemoglobin A1c 7.3 (H) 10/01/2019 07:41 AM    Hemoglobin A1c (POC) 9.7 07/07/2020 03:05 AM         RADIOLOGY:  None new to review    OBJECTIVE:      Physical Exam  Constitutional:       Appearance: He is well-developed. Neck:      Musculoskeletal: Neck supple. Thyroid: No thyromegaly. Cardiovascular:      Rate and Rhythm: Normal rate and regular rhythm. Pulses:           Dorsalis pedis pulses are 2+ on the right side and 2+ on the left side. Posterior tibial pulses are 2+ on the right side and 2+ on the left side. Heart sounds: No murmur. Pulmonary:      Effort: Pulmonary effort is normal. No respiratory distress. Breath sounds: Normal breath sounds. No wheezing. Musculoskeletal: Normal range of motion. Right foot: Normal range of motion. No deformity. Left foot: Normal range of motion. No deformity. Feet:      Right foot:      Protective Sensation: 5 sites tested. 5 sites sensed. Skin integrity: Skin integrity normal.      Toenail Condition: Right toenails are normal.      Left foot:      Protective Sensation: 5 sites tested. 5 sites sensed. Skin integrity: Skin integrity normal.      Toenail Condition: Left toenails are normal.   Skin:     General: Skin is warm and dry. Neurological:      Mental Status: He is alert and oriented to person, place, and time.            Vitals:    07/07/20 1358   BP: 133/79   Pulse: 87   Resp: 16   Temp: 97.5 °F (36.4 °C)   TempSrc: Oral   SpO2: 98%   Weight: 179 lb (81.2 kg)   Height: 5' 7\" (1.702 m)   PainSc:   0 - No pain         Assessment & Plan:    1. Type 2 diabetes mellitus without complication, without long-term current use of insulin ([facility] [address]  Foot exam today normal. Educated on foot care. Educated on options for bringing down a1c. Will intensify diet/lifestyle, increse glyburide-metformin dose and if labs appropriate start SGLT2    - AMB POC HEMOGLOBIN A1C  - HM DIABETES FOOT EXAM  - METABOLIC PANEL, COMPREHENSIVE; Future  - COLLECTION VENOUS BLOOD,VENIPUNCTURE    2. Mixed hyperlipidemia  Continue statin per DM guidelines  - rosuvastatin (CRESTOR) 10 mg tablet; Take 1 Tab by mouth nightly. Dispense: 90 Tab; Refill: 3    3. Encounter for laboratory examination  Will call patient, if kidney function appropriate can start SGLT2  - COLLECTION VENOUS BLOOD,VENIPUNCTURE        Orders Placed This Encounter    METABOLIC PANEL, COMPREHENSIVE     Standing Status:   Future     Standing Expiration Date:   10/5/2020    AMB POC HEMOGLOBIN A1C     DIABETES FOOT EXAM  [order this if you have performed a diabetic foot exam today)    COLLECTION VENOUS BLOOD,VENIPUNCTURE    rosuvastatin (CRESTOR) 10 mg tablet     Sig: Take 1 Tab by mouth nightly. Dispense:  90 Tab     Refill:  3    glyBURIDE-metFORMIN (Glucovance) 5-500 mg per tablet     Sig: Take 1 Tab by mouth two (2) times daily (with meals). Dispense:  60 Tab     Refill:  1       Follow-up and Dispositions    · Return in about 5 weeks (around 8/11/2020), or if symptoms worsen or fail to improve, for short term DM follow up. Plan of care reviewed with patient. Patient in agreement with plan and expresses understanding. I have discussed when to anticipate results and how results will be communicated, if applicable. Anticipatory guidance given and questions answered, patient encouraged to call or RTO if further questions or concerns.     Zoila Sahu NP  07/07/20

## 2020-07-07 NOTE — PATIENT INSTRUCTIONS
Noninsulin Medicines for Type 2 Diabetes: Care Instructions Overview There are different types of noninsulin medicines for diabetes. Each works in a different way. But they all help you control your blood sugar. Some types help your body make insulin to lower your blood sugar. Others lower how much insulin your body needs. Some can slow how fast your body digests sugars. And some can remove extra glucose through your urine. You may need to take more than one medicine for diabetes. Two or more medicines may work better to lower your blood sugar level than just one does. · Metformin. This lowers how much glucose your liver makes. And it helps you respond better to insulin. It also lowers the amount of stored sugar that your liver releases when you are not eating. · Sulfonylureas. These help your body release more insulin. Some work for many hours. They can cause low blood sugar if you don't eat as you planned. An example is glipizide. · Thiazolidinediones. These reduce the amount of blood glucose. They also help you respond better to insulin. An example is pioglitazone. · SGLT2 inhibitors. These help to remove extra glucose through your urine. They may also help some people lose weight. An example is ertugliflozin. · DPP-4 inhibitors. These help your body raise the level of insulin after you eat. They also help your body make less of a hormone that raises blood sugar. An example is alogliptin. · Incretin hormones (GLP-1 receptor agonists). These help your body make a protein that can raise your insulin level and make you less hungry. They're given as shots or pills. An example is semaglutide. · Meglitinides. These help your body release insulin. They also help slow how your body digests sugars. So they can keep your blood sugar from rising too fast after you eat. · Alpha-glucosidase inhibitors. These keep starches from breaking down. This means that they lower the amount of glucose absorbed when you eat. They don't help your body make more insulin. So they will not cause low blood sugar unless you use them with other medicines for diabetes. Follow-up care is a key part of your treatment and safety. Be sure to make and go to all appointments, and call your doctor if you are having problems. It's also a good idea to know your test results and keep a list of the medicines you take. How can you care for yourself at home? · Eat a healthy diet. Get some exercise each day. This may help you to reduce how much medicine you need. · Do not take other prescription or over-the-counter medicines, vitamins, herbal products, or supplements without talking to your doctor first. Some medicines for type 2 diabetes can cause problems with other medicines or supplements. · Tell your doctor if you plan to get pregnant. Some of these drugs are not safe for pregnant women. · Be safe with medicines. Take your medicines exactly as prescribed. Meglitinides and sulfonylureas can cause your blood sugar to drop very low. Call your doctor if you think you are having a problem with your medicine. · Check your blood sugar often. You can use a glucose monitor. Keeping track can help you know how certain foods, activities, and medicines affect your blood sugar. And it can help you keep your blood sugar from getting so low that it's not safe. When should you call for help? [id number] anytime you think you may need emergency care. For example, call if: 
· You passed out (lost consciousness). · You are confused or cannot think clearly. · Your blood sugar is very high or very low. Watch closely for changes in your health, and be sure to contact your doctor if: 
· Your blood sugar stays outside the level your doctor set for you. · You have any problems. Where can you learn more? Go to http://shonda-emil.info/ Enter H153 in the search box to learn more about \"Noninsulin Medicines for Type 2 Diabetes: Care Instructions. \" Current as of: December 20, 2019               Content Version: 12.5 © 5203-8523 [employer]. Care instructions adapted under license by [employer] (which disclaims liability or warranty for this information). If you have questions about a medical condition or this instruction, always ask your healthcare professional. [address] any warranty or liability for your use of this information. Learning About Diabetes Food Guidelines Your Care Instructions Meal planning is important to manage diabetes. It helps keep your blood sugar at a target level (which you set with your doctor). You don't have to eat special foods. You can eat what your family eats, including sweets once in a while. But you do have to pay attention to how often you eat and how much you eat of certain foods. You may want to work with a dietitian or a certified diabetes educator (CDE) to help you plan meals and snacks. A dietitian or CDE can also help you lose weight if that is one of your goals. What should you know about eating carbs? Managing the amount of carbohydrate (carbs) you eat is an important part of healthy meals when you have diabetes. Carbohydrate is found in many foods. · Learn which foods have carbs. And learn the amounts of carbs in different foods. ? Bread, cereal, pasta, and rice have about 15 grams of carbs in a serving. A serving is 1 slice of bread (1 ounce), ½ cup of cooked cereal, or 1/3 cup of cooked pasta or rice. ? Fruits have 15 grams of carbs in a serving. A serving is 1 small fresh fruit, such as an apple or orange; ½ of a banana; ½ cup of cooked or canned fruit; ½ cup of fruit juice; 1 cup of melon or raspberries; or 2 tablespoons of dried fruit. ? Milk and no-sugar-added yogurt have 15 grams of carbs in a serving.  A serving is 1 cup of milk or 2/3 cup of no-sugar-added yogurt. ? Starchy vegetables have 15 grams of carbs in a serving. A serving is ½ cup of mashed potatoes or sweet potato; 1 cup winter squash; ½ of a small baked potato; ½ cup of cooked beans; or ½ cup cooked corn or green peas. · Learn how much carbs to eat each day and at each meal. A dietitian or CDE can teach you how to keep track of the amount of carbs you eat. This is called carbohydrate counting. · If you are not sure how to count carbohydrate grams, use the Plate Method to plan meals. It is a good, quick way to make sure that you have a balanced meal. It also helps you spread carbs throughout the day. ? Divide your plate by types of foods. Put non-starchy vegetables on half the plate, meat or other protein food on one-quarter of the plate, and a grain or starchy vegetable in the final quarter of the plate. To this you can add a small piece of fruit and 1 cup of milk or yogurt, depending on how many carbs you are supposed to eat at a meal. 
· Try to eat about the same amount of carbs at each meal. Do not \"save up\" your daily allowance of carbs to eat at one meal. 
· Proteins have very little or no carbs per serving. Examples of proteins are beef, chicken, turkey, fish, eggs, tofu, cheese, cottage cheese, and peanut butter. A serving size of meat is 3 ounces, which is about the size of a deck of cards. Examples of meat substitute serving sizes (equal to 1 ounce of meat) are 1/4 cup of cottage cheese, 1 egg, 1 tablespoon of peanut butter, and ½ cup of tofu. How can you eat out and still eat healthy? · Learn to estimate the serving sizes of foods that have carbohydrate. If you measure food at home, it will be easier to estimate the amount in a serving of restaurant food. · If the meal you order has too much carbohydrate (such as potatoes, corn, or baked beans), ask to have a low-carbohydrate food instead. Ask for a salad or green vegetables. · If you use insulin, check your blood sugar before and after eating out to help you plan how much to eat in the future. · If you eat more carbohydrate at a meal than you had planned, take a walk or do other exercise. This will help lower your blood sugar. What else should you know? · Limit saturated fat, such as the fat from meat and dairy products. This is a healthy choice because people who have diabetes are at higher risk of heart disease. So choose lean cuts of meat and nonfat or low-fat dairy products. Use olive or canola oil instead of butter or shortening when cooking. · Don't skip meals. Your blood sugar may drop too low if you skip meals and take insulin or certain medicines for diabetes. · Check with your doctor before you drink alcohol. Alcohol can cause your blood sugar to drop too low. Alcohol can also cause a bad reaction if you take certain diabetes medicines. Follow-up care is a key part of your treatment and safety. Be sure to make and go to all appointments, and call your doctor if you are having problems. It's also a good idea to know your test results and keep a list of the medicines you take. Where can you learn more? Go to http://shonda-emil.info/ Enter C439 in the search box to learn more about \"Learning About Diabetes Food Guidelines. \" Current as of: December 20, 2019               Content Version: 12.5 © 7708-8269 [employer]. Care instructions adapted under license by [employer] (which disclaims liability or warranty for this information). If you have questions about a medical condition or this instruction, always ask your healthcare professional. [place] [address] any warranty or liability for your use of this information. Learning About Foot and Toenail Care Foot and toenail care: Overview Checking your loved one's feet and keeping them clean and soft can help prevent cracks and infection in the skin. This is especially important for people who have diabetes. Keeping toenails trimmedand polished if that's what the person likesalso helps the person feel well-groomed. If the person you care for has diabetes or has foot problems, such as bad bunions and corns, think about taking them to see a podiatrist. This is a doctor who specializes in the care of the feet. Sometimes a podiatrist will come to the home if the person can't go out for visits. Try to take the person for salon pedicures if that is what they want. It's a chance to get out and see people and continue a favorite activity. You can do basic nail care at home. Usually all you need to do is keep the nails clean and at a safe length. How do you trim someone's toenails? Try to trim the person's nails every week. Or check the nails each week to see if they need to be trimmed. It's easiest to trim nails after the person has had a shower or foot bath. It makes the nails softer and easier to trim. Start by gathering your supplies. You will need toenail clippers and a nail file. You may also need nail polish and nail polish remover. To trim the nails: 
1. Wash and dry your hands. You don't need to wear gloves. 2. Use nail polish remover to take off any polish. 3. Hold the person's foot and toe steady with one hand while you trim the nail with your other hand. Trim the nails straight across. Leave the nails a little longer at the corners so that the sharp ends don't cut into the skin. 4. Keep the nails no longer than the tip of the toes. 5. Let the nails dry if they are still damp and soft. 6. Use a nail file to gently smooth the edges of the nails, especially at the corners. They may be sharp after the nails are cut straight. 7. Apply nail polish, if the person wants it. If the person's nails are thick and discolored, it may be safest to have a podiatrist cut them. What else do you need to know? When you're caring for someone's nails, it is important to remember not to trim or cut the cuticles. A minor cut in a cuticle could lead to an infection. Wash the feet daily in the shower or bath or in a basin made for washing feet. It's extra important to wash the feet carefully if the person has diabetes. After washing the feet, dry gently. Put lotion on the feet, especially on the heels. But don't put it between the toes. If the person doesn't have diabetes and you see signs of athlete's foot (such as dry, cracking, or itchy skin between the toes), you can try an over-the-counter medicine. These medicines can kill the fungus that causes athlete's foot. If the problem doesn't go away, talk to the person's doctor. Look every day for cuts or signs of infection, such as pain, swelling, redness, or warmth. If you see any of these signsespecially in someone who has diabetescall the doctor. Where can you learn more? Go to http://www.gray.com/ Enter A726 in the search box to learn more about \"Learning About Foot and Toenail Care. \" Current as of: December 9, 2019               Content Version: 12.5 © 2306-0724 Healthwise, Incorporated. Care instructions adapted under license by [employer] (which disclaims liability or warranty for this information). If you have questions about a medical condition or this instruction, always ask your healthcare professional. [place] [address] any warranty or liability for your use of this information.

## 2020-07-07 NOTE — PROGRESS NOTES
Patient presents for lab draw ordered by Emory Collins NP  Ordering Department/Practice:  [facility]  Date Ordered:  7-7-2020    The following labs were drawn and sent to [facility]    The following tubes were sent:    Gold  ( 1)    Draw site:  LAC  Pain Level:0  Needle Gauge23  Aseptic technique used  Blood thinners:n  Band-Aid applied  Draw fee added   Procedure tolerated well, patient voiced no complaints.

## 2020-07-07 NOTE — PROGRESS NOTES
Esteban Elaine presents today for   Chief Complaint   Patient presents with    Diabetes    Medication Refill       Is someone accompanying this pt? no    Is the patient using any DME equipment during OV? no    Depression Screening:  3 most recent PHQ Screens 7/7/2020   Little interest or pleasure in doing things Not at all   Feeling down, depressed, irritable, or hopeless Not at all   Total Score PHQ 2 0   Trouble falling or staying asleep, or sleeping too much Not at all   Feeling tired or having little energy Not at all   Poor appetite, weight loss, or overeating Not at all   Feeling bad about yourself - or that you are a failure or have let yourself or your family down Not at all   Trouble concentrating on things such as school, work, reading, or watching TV Not at all   Moving or speaking so slowly that other people could have noticed; or the opposite being so fidgety that others notice Not at all   Thoughts of being better off dead, or hurting yourself in some way Not at all   PHQ 9 Score 0   How difficult have these problems made it for you to do your work, take care of your home and get along with others Not difficult at all       Learning Assessment:  Learning Assessment 2/8/2019   PRIMARY LEARNER Patient   HIGHEST LEVEL OF EDUCATION - PRIMARY LEARNER  [id number] Oziel Vivas PRIMARY LEARNER NONE   CO-LEARNER CAREGIVER -   PRIMARY LANGUAGE ENGLISH   LEARNER PREFERENCE PRIMARY DEMONSTRATION   ANSWERED BY patient   RELATIONSHIP SELF       Abuse Screening:  Abuse Screening Questionnaire 1/11/2019   Do you ever feel afraid of your partner? N   Are you in a relationship with someone who physically or mentally threatens you? N   Is it safe for you to go home? Y       Fall Risk  No flowsheet data found. Health Maintenance reviewed and discussed and ordered per Provider.     Health Maintenance Due   Topic Date Due    Pneumococcal 0-64 years (1 of 1 - PPSV23) 08/20/1989    Foot Exam Q1  08/20/1993    Eye Exam Retinal or Dilated  08/20/1993    DTaP/Tdap/Td series (1 - Tdap) 08/20/2004   . Coordination of Care:  1. Have you been to the ER, urgent care clinic since your last visit? Hospitalized since your last visit? no    2. Have you seen or consulted any other health care providers outside of the [facility] since your last visit? Include any pap smears or colon screening.  no

## 2020-07-08 LAB
ALBUMIN SERPL-MCNC: 4.4 G/DL (ref 3.4–5)
ALBUMIN/GLOB SERPL: 1.5 {RATIO} (ref 0.8–1.7)
ALP SERPL-CCNC: 70 U/L (ref 45–117)
ALT SERPL-CCNC: 41 U/L (ref 16–61)
ANION GAP SERPL CALC-SCNC: 9 MMOL/L (ref 3–18)
AST SERPL-CCNC: 18 U/L (ref 10–38)
BILIRUB SERPL-MCNC: 0.7 MG/DL (ref 0.2–1)
BUN SERPL-MCNC: 22 MG/DL (ref 7–18)
BUN/CREAT SERPL: 20 (ref 12–20)
CALCIUM SERPL-MCNC: 9.3 MG/DL (ref 8.5–10.1)
CHLORIDE SERPL-SCNC: 106 MMOL/L (ref 100–111)
CO2 SERPL-SCNC: 29 MMOL/L (ref 21–32)
CREAT SERPL-MCNC: 1.12 MG/DL (ref 0.6–1.3)
GLOBULIN SER CALC-MCNC: 3 G/DL (ref 2–4)
GLUCOSE SERPL-MCNC: 272 MG/DL (ref 74–99)
POTASSIUM SERPL-SCNC: 4.9 MMOL/L (ref 3.5–5.5)
PROT SERPL-MCNC: 7.4 G/DL (ref 6.4–8.2)
SODIUM SERPL-SCNC: 144 MMOL/L (ref 136–145)

## 2020-07-12 DIAGNOSIS — E11.9 TYPE 2 DIABETES MELLITUS WITHOUT COMPLICATION, WITHOUT LONG-TERM CURRENT USE OF INSULIN (HCC): Primary | ICD-10-CM

## 2020-07-12 NOTE — PROGRESS NOTES
Discussed with patient at visit 7/7/2020 adding an SGLT2 inhibitor for DM control if kidney function good- it is. We will start jardiance. Plan to follow up at Aug appointment.

## 2020-07-13 ENCOUNTER — TELEPHONE (OUTPATIENT)
Dept: FAMILY MEDICINE CLINIC | Age: 35
End: 2020-07-13

## 2020-07-13 NOTE — TELEPHONE ENCOUNTER
Called patient at this time to discuss lab results and addition of jardiance. No answer. Indicated he may call back at his convenience if he has any questions. At previous office visit discussed adding SGLT2 inhibitor for further glycemic control if kidney function appropriate- it is. Sent Jardiance to pharmacy. Follow up visit planned 8/10/2020 to assess effect of medication. Keep blood sugar log.

## 2020-07-13 NOTE — TELEPHONE ENCOUNTER
----- Message from Morenita Reddy NP sent at 7/12/2020  3:36 PM EDT -----  Call patient regarding jardiance addition

## 2020-07-31 RX ORDER — GLYBURIDE-METFORMIN HYDROCHLORIDE 5; 500 MG/1; MG/1
TABLET ORAL
Qty: 60 TAB | Refills: 1 | Status: SHIPPED | OUTPATIENT
Start: 2020-07-31 | End: 2020-08-25

## 2020-08-03 ENCOUNTER — TELEPHONE (OUTPATIENT)
Dept: FAMILY MEDICINE CLINIC | Age: 35
End: 2020-08-03

## 2020-08-03 DIAGNOSIS — Z20.822 EXPOSURE TO COVID-19 VIRUS: Primary | ICD-10-CM

## 2020-08-03 NOTE — TELEPHONE ENCOUNTER
Called patient back at this time. 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 His work has deemed him 'not at high risk for jan' as he has had 'limited contact' with this other man, but patient reports he has in fact had very close contact. At this point, the patient does not have any symptoms of COVID but reports some family members are feeling 'under the weather' - namely 3year old with diarrhea. Will place order for COVID screening per his request as he does have multiple comorbid conditions including diabetes and high blood pressure. [facility] information [facility] to [facility] in AM to set up appointment to go in for testing.

## 2020-08-03 NOTE — TELEPHONE ENCOUNTER
Please see message    Is at high risk due to diabetes has a son with autoimmune disease, was in contact with a co-worker that tested positive yesterday. Patient is not having any symptoms at this point has self quarantined.   Please call

## 2020-08-05 DIAGNOSIS — E11.9 TYPE 2 DIABETES MELLITUS WITHOUT COMPLICATION, WITHOUT LONG-TERM CURRENT USE OF INSULIN (HCC): ICD-10-CM

## 2020-08-06 RX ORDER — EMPAGLIFLOZIN 10 MG/1
TABLET, FILM COATED ORAL
Qty: 30 TAB | Refills: 0 | Status: SHIPPED | OUTPATIENT
Start: 2020-08-06 | End: 2020-09-03

## 2020-09-01 DIAGNOSIS — E11.9 TYPE 2 DIABETES MELLITUS WITHOUT COMPLICATION, WITHOUT LONG-TERM CURRENT USE OF INSULIN (HCC): ICD-10-CM

## 2020-09-03 RX ORDER — EMPAGLIFLOZIN 10 MG/1
TABLET, FILM COATED ORAL
Qty: 30 TAB | Refills: 0 | Status: SHIPPED | OUTPATIENT
Start: 2020-09-03 | End: 2020-10-02

## 2020-10-02 DIAGNOSIS — E11.9 TYPE 2 DIABETES MELLITUS WITHOUT COMPLICATION, WITHOUT LONG-TERM CURRENT USE OF INSULIN (HCC): ICD-10-CM

## 2020-10-02 RX ORDER — EMPAGLIFLOZIN 10 MG/1
TABLET, FILM COATED ORAL
Qty: 30 TAB | Refills: 0 | Status: SHIPPED | OUTPATIENT
Start: 2020-10-02 | End: 2020-10-16 | Stop reason: SDUPTHER

## 2020-10-16 ENCOUNTER — HOSPITAL ENCOUNTER (OUTPATIENT)
Dept: LAB | Age: 35
Discharge: HOME | End: 2020-10-16
Payer: COMMERCIAL

## 2020-10-16 ENCOUNTER — OFFICE VISIT (OUTPATIENT)
Dept: FAMILY MEDICINE CLINIC | Age: 35
End: 2020-10-16
Payer: COMMERCIAL

## 2020-10-16 VITALS
BODY MASS INDEX: 27.94 KG/M2 | RESPIRATION RATE: 16 BRPM | WEIGHT: 178 LBS | SYSTOLIC BLOOD PRESSURE: 127 MMHG | HEIGHT: 67 IN | TEMPERATURE: 98.7 F | OXYGEN SATURATION: 97 % | HEART RATE: 74 BPM | DIASTOLIC BLOOD PRESSURE: 89 MMHG

## 2020-10-16 DIAGNOSIS — Z13.0 SCREENING FOR ENDOCRINE, METABOLIC AND IMMUNITY DISORDER: ICD-10-CM

## 2020-10-16 DIAGNOSIS — Z13.228 SCREENING FOR ENDOCRINE, METABOLIC AND IMMUNITY DISORDER: ICD-10-CM

## 2020-10-16 DIAGNOSIS — G89.29 CHRONIC PAIN OF RIGHT KNEE: ICD-10-CM

## 2020-10-16 DIAGNOSIS — Z13.29 SCREENING FOR ENDOCRINE, METABOLIC AND IMMUNITY DISORDER: ICD-10-CM

## 2020-10-16 DIAGNOSIS — M25.561 CHRONIC PAIN OF RIGHT KNEE: ICD-10-CM

## 2020-10-16 DIAGNOSIS — Z01.89 ENCOUNTER FOR LABORATORY EXAMINATION: ICD-10-CM

## 2020-10-16 DIAGNOSIS — E11.9 TYPE 2 DIABETES MELLITUS WITHOUT COMPLICATION, WITHOUT LONG-TERM CURRENT USE OF INSULIN (HCC): Primary | ICD-10-CM

## 2020-10-16 LAB
ALBUMIN SERPL-MCNC: 4.4 G/DL (ref 3.4–5)
ALBUMIN/GLOB SERPL: 1.5 {RATIO} (ref 0.8–1.7)
ALP SERPL-CCNC: 69 U/L (ref 45–117)
ALT SERPL-CCNC: 47 U/L (ref 16–61)
ANION GAP SERPL CALC-SCNC: 6 MMOL/L (ref 3–18)
AST SERPL-CCNC: 22 U/L (ref 10–38)
BILIRUB SERPL-MCNC: 0.5 MG/DL (ref 0.2–1)
BUN SERPL-MCNC: 23 MG/DL (ref 7–18)
BUN/CREAT SERPL: 26 (ref 12–20)
CALCIUM SERPL-MCNC: 8.9 MG/DL (ref 8.5–10.1)
CHLORIDE SERPL-SCNC: 108 MMOL/L (ref 100–111)
CO2 SERPL-SCNC: 30 MMOL/L (ref 21–32)
CREAT SERPL-MCNC: 0.9 MG/DL (ref 0.6–1.3)
CREAT UR-MCNC: 149 MG/DL (ref 30–125)
GLOBULIN SER CALC-MCNC: 3 G/DL (ref 2–4)
GLUCOSE SERPL-MCNC: 99 MG/DL (ref 74–99)
MICROALBUMIN UR-MCNC: 2.06 MG/DL (ref 0–3)
MICROALBUMIN/CREAT UR-RTO: 14 MG/G (ref 0–30)
POTASSIUM SERPL-SCNC: 4.5 MMOL/L (ref 3.5–5.5)
PROT SERPL-MCNC: 7.4 G/DL (ref 6.4–8.2)
SODIUM SERPL-SCNC: 144 MMOL/L (ref 136–145)

## 2020-10-16 PROCEDURE — 83036 HEMOGLOBIN GLYCOSYLATED A1C: CPT | Performed by: NURSE PRACTITIONER

## 2020-10-16 PROCEDURE — 3051F HG A1C>EQUAL 7.0%<8.0%: CPT | Performed by: NURSE PRACTITIONER

## 2020-10-16 PROCEDURE — 36415 COLL VENOUS BLD VENIPUNCTURE: CPT | Performed by: NURSE PRACTITIONER

## 2020-10-16 PROCEDURE — 99213 OFFICE O/P EST LOW 20 MIN: CPT | Performed by: NURSE PRACTITIONER

## 2020-10-16 PROCEDURE — 82043 UR ALBUMIN QUANTITATIVE: CPT

## 2020-10-16 PROCEDURE — 80053 COMPREHEN METABOLIC PANEL: CPT

## 2020-10-16 PROCEDURE — 36415 COLL VENOUS BLD VENIPUNCTURE: CPT

## 2020-10-16 NOTE — PROGRESS NOTES
Patient presents for lab draw ordered by Trae Ford  Ordering Department/Practice:  [facility]  Date Ordered:  10-     The following labs were drawn and sent to [facility]    The following tubes were sent:    Gold  ( 1)    Draw site:  LAC  Pain Level:0  Needle Gauge23  Aseptic technique used  Blood thinners:n  Band-Aid applied  Draw fee added   Procedure tolerated well, patient voiced no complaints.

## 2020-10-16 NOTE — PROGRESS NOTES
Luis Eduardo Mullins presents today for   Chief Complaint   Patient presents with    Follow Up Chronic Condition    Knee Pain     Rt knee       Is someone accompanying this pt? no    Is the patient using any DME equipment during OV? no    Depression Screening:  3 most recent PHQ Screens 10/16/2020   Little interest or pleasure in doing things Not at all   Feeling down, depressed, irritable, or hopeless Not at all   Total Score PHQ 2 0   Trouble falling or staying asleep, or sleeping too much Not at all   Feeling tired or having little energy Not at all   Poor appetite, weight loss, or overeating Not at all   Feeling bad about yourself - or that you are a failure or have let yourself or your family down Not at all   Trouble concentrating on things such as school, work, reading, or watching TV Not at all   Moving or speaking so slowly that other people could have noticed; or the opposite being so fidgety that others notice Not at all   Thoughts of being better off dead, or hurting yourself in some way Not at all   PHQ 9 Score 0   How difficult have these problems made it for you to do your work, take care of your home and get along with others Not difficult at all       Learning Assessment:  Learning Assessment 2/8/2019   PRIMARY LEARNER Patient   HIGHEST LEVEL OF EDUCATION - PRIMARY LEARNER  [id number] Oziel Vivas PRIMARY LEARNER NONE   CO-LEARNER CAREGIVER -   PRIMARY LANGUAGE ENGLISH   LEARNER PREFERENCE PRIMARY DEMONSTRATION   ANSWERED BY patient   RELATIONSHIP SELF       Abuse Screening:  Abuse Screening Questionnaire 1/11/2019   Do you ever feel afraid of your partner? N   Are you in a relationship with someone who physically or mentally threatens you? N   Is it safe for you to go home? Y       Fall Risk  No flowsheet data found. Health Maintenance reviewed and discussed and ordered per Provider.     Health Maintenance Due   Topic Date Due    Pneumococcal 0-64 years (1 of 1 - PPSV23) 08/20/1989    DTaP/Tdap/Td series (1 - Tdap) 08/20/2004    Flu Vaccine (1) 09/01/2020    MICROALBUMIN Q1  10/01/2020    A1C test (Diabetic or Prediabetic)  10/07/2020    Lipid Screen  10/01/2020   . Coordination of Care:  1. Have you been to the ER, urgent care clinic since your last visit? Hospitalized since your last visit? no    2. Have you seen or consulted any other health care providers outside of the [address] [place] [address] since your last visit? Include any pap smears or colon screening.  no

## 2020-10-16 NOTE — PROGRESS NOTES
OFFICE NOTE    Josie Salazar is a 38 y.o. male presenting today for office visit. 10/21/2020  12:54 PM    Chief Complaint   Patient presents with    Follow Up Chronic Condition    Knee Pain     Rt knee     HPI:     Type II DM-   Patient was increased on last visit in 7/2020 to glyburide/metformin 5-500 BID and Jardiance 10 mg tab was added. Patient did have a CMP 7/2020 to check kidney function and it was normal therefore his PCP added Jardiance. Patient was educated on last visit to also incorporate a healthier lifestyle and diet. Last A1C was 9.7% in July 2020. Denies hypoglycemia, polyuria, polydipsia, polyphagia or vision changes. Patient is needing a refill today on Jardiance. A1c today is 7.2%. Will continue same regimen and do next A1c in 3 months. The goal is to decrease medications. Knee pain:  Patient has a history of right knee pain that causes his knee cap to pop out of place and feels like it locks and he have to keep flexing it for knee to go back in place with soreness then it goes away. Use to have infrequent but know this happens every few days. Tenderness towards inner knee caps. Patient states he seen a orthopedic in 2010 and had MRI and xrays but all results were normal.  The popping sound is getting louder and happen at any time. He states he can just be walking. Normally it will start by just being sore than stiffen up as the day go on and it tightens more and more and popping will occur. HM:  Patient gets flu vaccine at his job and will bring in copy. Review of Systems   Constitutional: Negative. HENT: Negative. Respiratory: Negative for cough, chest tightness, shortness of breath and wheezing. Cardiovascular: Negative for chest pain. Gastrointestinal: Negative. Musculoskeletal: Negative for gait problem and joint swelling. Right knee pain and popping   Skin: Negative. Neurological: Negative. Psychiatric/Behavioral: Negative. PHQ Screening   3 most recent PHQ Screens 10/16/2020   Little interest or pleasure in doing things Not at all   Feeling down, depressed, irritable, or hopeless Not at all   Total Score PHQ 2 0   Trouble falling or staying asleep, or sleeping too much Not at all   Feeling tired or having little energy Not at all   Poor appetite, weight loss, or overeating Not at all   Feeling bad about yourself - or that you are a failure or have let yourself or your family down Not at all   Trouble concentrating on things such as school, work, reading, or watching TV Not at all   Moving or speaking so slowly that other people could have noticed; or the opposite being so fidgety that others notice Not at all   Thoughts of being better off dead, or hurting yourself in some way Not at all   PHQ 9 Score 0   How difficult have these problems made it for you to do your work, take care of your home and get along with others Not difficult at all         History  Past Medical History:   Diagnosis Date    Diabetes ([facility] [place] [address] 01/2019    Hyperlipidemia 01/2019    Hypertension     intermittently for a few years    Irritable bowel syndrome        Past Surgical History:   Procedure Laterality Date    HX ORTHOPAEDIC      hammer toe surgery    HX WISDOM TEETH EXTRACTION         Social History     Socioeconomic History    Marital status:      Spouse name: Not on file    Number of children: Not on file    Years of education: Not on file    Highest education level: Not on file   Occupational History    Not on file   Social Needs    Financial resource strain: Not on file    Food insecurity     Worry: Not on file     Inability: Not on file   [employer] needs     Medical: Not on file     Non-medical: Not on file   Tobacco Use    Smoking status: Current Some Day Smoker     Types: Cigars    Smokeless tobacco: Current User     Types: Chew    Tobacco comment: maybe once per week    Substance and Sexual Activity    Alcohol use: Yes     Comment: occasional    Drug use: No    Sexual activity: Not on file   Lifestyle    Physical activity     Days per week: Not on file     Minutes per session: Not on file    Stress: Not on file   Relationships    Social connections     Talks on phone: Not on file     Gets together: Not on file     Attends [employer] service: Not on file     Active member of club or organization: Not on file     Attends meetings of clubs or organizations: Not on file     Relationship status: Not on file    Intimate partner violence     Fear of current or ex partner: Not on file     Emotionally abused: Not on file     Physically abused: Not on file     Forced sexual activity: Not on file   Other Topics Concern    Not on file   Social History Narrative    Not on file       Family History   Problem Relation Age of Onset    High Cholesterol Mother     Diabetes Father     Cancer Sister     Diabetes Maternal Grandmother     Dementia Paternal Grandmother     Heart Disease Paternal Grandfather     Heart Attack Paternal Grandfather        Not on File    Current Outpatient Medications   Medication Sig Dispense Refill    empagliflozin (Jardiance) 10 mg tablet TAKE 1 TABLET BY MOUTH EVERY DAY 90 Tab 0    glyBURIDE-metFORMIN (GLUCOVANCE) 5-500 mg per tablet TAKE 1 TABLET BY MOUTH TWICE A DAY WITH MEALS 60 Tab 0    rosuvastatin (CRESTOR) 10 mg tablet Take 1 Tab by mouth nightly. 90 Tab 3    glucose blood VI test strips (ASCENSIA AUTODISC VI, ONE TOUCH ULTRA TEST VI) strip Check blood sugar three times a day after each meal *ONE TOUCH ULTRA BLUE* 100 Strip 5    Blood-Glucose Meter monitoring kit Check blood sugar three times a day after meals 1 Kit 0    lancets misc Check blood sugar three times a day after each meal 100 Each 0    aspirin delayed-release 81 mg tablet Take  by mouth daily. Health Maintenance reviewed - Will discuss further on next visit. Patient unable to do lipid today.   .    Patient Care Team:  Patient Care Team:  Zehra Galindo NP as PCP - General (Nurse Practitioner)  Zehra Galindo NP as PCP - [facility]        LABS/Results:  Results for orders placed or performed during the hospital encounter of 65/69/23   METABOLIC PANEL, COMPREHENSIVE   Result Value Ref Range    Sodium 144 136 - 145 mmol/L    Potassium 4.9 3.5 - 5.5 mmol/L    Chloride 106 100 - 111 mmol/L    CO2 29 21 - 32 mmol/L    Anion gap 9 3.0 - 18 mmol/L    Glucose 272 (H) 74 - 99 mg/dL    BUN 22 (H) 7.0 - 18 MG/DL    Creatinine 1.12 0.6 - 1.3 MG/DL    BUN/Creatinine ratio 20 12 - 20      GFR est AA >60 >60 ml/min/1.73m2    GFR est non-AA >60 >60 ml/min/1.73m2    Calcium 9.3 8.5 - 10.1 MG/DL    Bilirubin, total 0.7 0.2 - 1.0 MG/DL    ALT (SGPT) 41 16 - 61 U/L    AST (SGOT) 18 10 - 38 U/L    Alk. phosphatase 70 45 - 117 U/L    Protein, total 7.4 6.4 - 8.2 g/dL    Albumin 4.4 3.4 - 5.0 g/dL    Globulin 3.0 2.0 - 4.0 g/dL    A-G Ratio 1.5 0.8 - 1.7           RADIOLOGY:  None new to review      Physical Exam  Constitutional:       Appearance: He is well-developed. Neck:      Musculoskeletal: Normal range of motion. Cardiovascular:      Rate and Rhythm: Normal rate and regular rhythm. Heart sounds: Normal heart sounds. Pulmonary:      Effort: Pulmonary effort is normal.      Breath sounds: Normal breath sounds. Musculoskeletal: Normal range of motion. General: Tenderness (anterior knee upon palpation) present. No swelling or deformity. Right lower leg: No edema. Lymphadenopathy:      Cervical: No cervical adenopathy. Skin:     General: Skin is warm and dry. Neurological:      Mental Status: He is alert and oriented to person, place, and time.    Psychiatric:         Mood and Affect: Mood normal.           Vitals:    10/16/20 1300 10/16/20 1352   BP: (!) 142/104 127/89   Pulse: 74    Resp: 16    Temp: 98.7 °F (37.1 °C)    TempSrc: Temporal    SpO2: 97%    Weight: 178 lb (80.7 kg) Height: 5' 7\" (1.702 m)    PainSc:   0 - No pain          Assessment and Plan    1. Type 2 diabetes mellitus without complication, without long-term current use of insulin (HCC)  - empagliflozin (Jardiance) 10 mg tablet; TAKE 1 TABLET BY MOUTH EVERY DAY  Dispense: 90 Tab; Refill: 0  - COLLECTION VENOUS BLOOD,VENIPUNCTURE    2. Screening for endocrine, metabolic and immunity disorder  - METABOLIC PANEL, COMPREHENSIVE; Future  - MICROALBUMIN, UR, RAND W/ MICROALB/CREAT RATIO; Future  - COLLECTION VENOUS BLOOD,VENIPUNCTURE    3. Chronic pain of right knee  - REFERRAL TO ORTHOPEDICS    4. Encounter for laboratory examination  - COLLECTION VENOUS BLOOD,VENIPUNCTURE      MDM    Procedures          *Plan of care reviewed with patient. Patient in agreement with plan and expresses understanding. All questions answered and patient encouraged to call or RTO if further questions or concerns. If symptoms worsen please go to nearest ER or call 911. *After summary care printed, reviewed and given to patient. Follow-up and Dispositions    · Return in about 3 months (around 1/16/2021) for 30 minute.

## 2020-10-21 NOTE — PROGRESS NOTES
Please inform patient that there labs were not significant but the microalbumin test that determines how well your kidneys are working was a little elevated (has come down since a great amount last year result). Advise patient to help increase their kidney function at this time it will help to eat more fiber, drink ore water and follow a low protein diet .

## 2020-10-22 RX ORDER — GLYBURIDE-METFORMIN HYDROCHLORIDE 5; 500 MG/1; MG/1
TABLET ORAL
Qty: 60 TAB | Refills: 0 | Status: CANCELLED | OUTPATIENT
Start: 2020-10-22

## 2020-10-22 NOTE — TELEPHONE ENCOUNTER
Requested Prescriptions     Pending Prescriptions Disp Refills    glyBURIDE-metFORMIN (GLUCOVANCE) 5-500 mg per tablet 60 Tab 0

## 2020-10-23 LAB — HBA1C MFR BLD HPLC: 7.2 %

## 2020-10-26 DIAGNOSIS — E11.9 TYPE 2 DIABETES MELLITUS WITHOUT COMPLICATION, WITHOUT LONG-TERM CURRENT USE OF INSULIN (HCC): Primary | ICD-10-CM

## 2020-10-26 RX ORDER — GLYBURIDE-METFORMIN HYDROCHLORIDE 5; 500 MG/1; MG/1
TABLET ORAL
Qty: 180 TAB | Refills: 0 | Status: SHIPPED | OUTPATIENT
Start: 2020-10-26 | End: 2021-01-06 | Stop reason: SDUPTHER

## 2020-11-12 ENCOUNTER — TELEPHONE (OUTPATIENT)
Dept: FAMILY MEDICINE CLINIC | Age: 35
End: 2020-11-12

## 2020-11-12 ENCOUNTER — OFFICE VISIT (OUTPATIENT)
Dept: ORTHOPEDIC SURGERY | Age: 35
End: 2020-11-12
Payer: COMMERCIAL

## 2020-11-12 VITALS
WEIGHT: 179 LBS | HEIGHT: 67 IN | RESPIRATION RATE: 18 BRPM | HEART RATE: 79 BPM | DIASTOLIC BLOOD PRESSURE: 100 MMHG | SYSTOLIC BLOOD PRESSURE: 127 MMHG | TEMPERATURE: 97.5 F | BODY MASS INDEX: 28.09 KG/M2 | OXYGEN SATURATION: 97 %

## 2020-11-12 DIAGNOSIS — G89.29 CHRONIC PAIN OF RIGHT KNEE: ICD-10-CM

## 2020-11-12 DIAGNOSIS — M25.561 CHRONIC PAIN OF RIGHT KNEE: ICD-10-CM

## 2020-11-12 DIAGNOSIS — M67.51 PLICA OF KNEE, RIGHT: Primary | ICD-10-CM

## 2020-11-12 PROCEDURE — 99203 OFFICE O/P NEW LOW 30 MIN: CPT | Performed by: ORTHOPAEDIC SURGERY

## 2020-11-12 PROCEDURE — 73560 X-RAY EXAM OF KNEE 1 OR 2: CPT | Performed by: ORTHOPAEDIC SURGERY

## 2020-11-12 NOTE — TELEPHONE ENCOUNTER
Pt stated he got a notification that the metformin was ready for  but when he went to get it they (pharmacy) said it was on hold until Tuesday 11/17. Patient want to know what should he do about it, he is out of this medication.  Please follow up to discuss when available

## 2020-11-12 NOTE — PROGRESS NOTES
Marian Das  1985   Chief Complaint   Patient presents with    Knee Pain     bilateral        HISTORY OF PRESENT ILLNESS  Marian Das is a 38 y.o. male who presents today for evaluation of b/l knee pain. R>L. He rates his pain 2/10 today. Pain has been present since 2006. No specific injury. Pt notes his right knee feels tight and \"locks up\" on him. The knee locks up on him at least 1 X week. 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 Pain with getting up from a seated position. Pt has trouble with bending the knee. Pt also notes a popping sensation. Has tried PT years ago with minimal relief. Patient describes the pain as aching, sharp, throbbing, dull and locking that is Constant in nature. Symptoms are worse with walking, standing, bending, Activity and is better with  nothing. Associated symptoms include popping. Since problem started, it: is unchanged. Pain does not wake patient up at night. Has taken no meds for the problem. Has tried following treatments: Injections:NO; Brace:NO;  Therapy:YES; Cane/Crutch:NO       No Known Allergies     Past Medical History:   Diagnosis Date    Diabetes ([facility] [address] 01/2019    Hyperlipidemia 01/2019    Hypertension     intermittently for a few years    Irritable bowel syndrome       Social History     Socioeconomic History    Marital status:      Spouse name: Not on file    Number of children: Not on file    Years of education: Not on file    Highest education level: Not on file   Occupational History    Not on file   Social Needs    Financial resource strain: Not on file    Food insecurity     Worry: Not on file     Inability: Not on file    Transportation needs     Medical: Not on file     Non-medical: Not on file   Tobacco Use    Smoking status: Current Some Day Smoker     Types: Cigars    Smokeless tobacco: Current User     Types: Chew    Tobacco comment: maybe once per week    Substance and Sexual Activity    Alcohol use: Yes     Comment: occasional    Drug use: No    Sexual activity: Not on file   Lifestyle    Physical activity     Days per week: Not on file     Minutes per session: Not on file    Stress: Not on file   Relationships    Social connections     Talks on phone: Not on file     Gets together: Not on file     Attends Denominational service: Not on file     Active member of club or organization: Not on file     Attends meetings of clubs or organizations: Not on file     Relationship status: Not on file    Intimate partner violence     Fear of current or ex partner: Not on file     Emotionally abused: Not on file     Physically abused: Not on file     Forced sexual activity: Not on file   Other Topics Concern    Not on file   Social History Narrative    Not on file      Past Surgical History:   Procedure Laterality Date    HX ORTHOPAEDIC      hammer toe surgery    HX WISDOM TEETH EXTRACTION        Family History   Problem Relation Age of Onset    High Cholesterol Mother     Diabetes Father     Cancer Sister     Diabetes Maternal Grandmother     Dementia Paternal Grandmother     Heart Disease Paternal Grandfather     Heart Attack Paternal Grandfather       Current Outpatient Medications   Medication Sig    glyBURIDE-metFORMIN (GLUCOVANCE) 5-500 mg per tablet TAKE 1 TABLET BY MOUTH TWICE A DAY WITH MEALS    empagliflozin (Jardiance) 10 mg tablet TAKE 1 TABLET BY MOUTH EVERY DAY    rosuvastatin (CRESTOR) 10 mg tablet Take 1 Tab by mouth nightly.  glucose blood VI test strips (ASCENSIA AUTODISC VI, ONE TOUCH ULTRA TEST VI) strip Check blood sugar three times a day after each meal *ONE TOUCH ULTRA BLUE*    Blood-Glucose Meter monitoring kit Check blood sugar three times a day after meals    lancets misc Check blood sugar three times a day after each meal    aspirin delayed-release 81 mg tablet Take  by mouth daily.      No current facility-administered medications for this visit. REVIEW OF SYSTEM   Patient denies: Weight loss, Fever/Chills, HA, Visual changes, Fatigue, Chest pain, SOB, Abdominal pain, N/V/D/C, Blood in stool or urine, Edema. Pertinent positive as above in HPI. All others were negative    PHYSICAL EXAM:   Visit Vitals  BP (!) 127/100 (BP 1 Location: Left arm)   Pulse 79   Temp 97.5 °F (36.4 °C)   Resp 18   Ht 5' 6.5\" (1.689 m)   Wt 179 lb (81.2 kg)   SpO2 97%   BMI 28.46 kg/m²     The patient is a well-developed, well-nourished male   in no acute distress. The patient is alert and oriented times three. The patient is alert and oriented times three. Mood and affect are normal.  LYMPHATIC: lymph nodes are not enlarged and are within normal limits  SKIN: normal in color and non tender to palpation. There are no bruises or abrasions noted. NEUROLOGICAL: Motor sensory exam is within normal limits. Reflexes are equal bilaterally. There is normal sensation to pinprick and light touch  MUSCULOSKELETAL:  Examination Right knee   Skin Intact   Range of motion 0-130   Effusion -   Medial joint line tenderness +   Lateral joint line tenderness -   Tenderness Pes Bursa -   Tenderness insertion MCL -   Tenderness insertion LCL -   Yousufs +   Patella crepitus +   Patella grind -   Lachman -   Pivot shift -   Anterior drawer -   Posterior drawer -   Varus stress -   Valgus stress -   Neurovascular Intact   Calf Swelling and Tenderness to Palpation -   Evelio's Test -   Hamstring Cord Tightness -         IMAGING: XR of right knee obtained in the office dated 11/12/2020 was reviewed and read by Dr. Louie Guardado: no acute abnormlities      IMPRESSION:      ICD-10-CM ICD-9-CM    1. Plica of knee, right  M67.51 727.83 MRI KNEE RT WO CONT   2. Chronic pain of right knee  M25.561 719.46 AMB POC XRAY, KNEE; 1/2 VIEWS    G89.29 338.29         PLAN:  1. Pt presents today with right knee pain and I am ordering an MRI to r/o a plica.  Will see him back following the MRI. Risk factors include: dm, htn   2. No ultrasound exam indicated today  3. No cortisone injection indicated today   4. No Physical/Occupational Therapy indicated today  5. Yes diagnostic test indicated today: MRI R KNEE  6. No durable medical equipment indicated today  7. No referral indicated today   8. No medications indicated today:   9. No Narcotic indicated today      RTC following MRI      Scribed by Herbert Russell) as dictated by Luis Olivia MD    I, Dr. Luis Olivia, confirm that all documentation is accurate.     Luis Olivia M.D.   [address] [place] [address] and Spine Specialist

## 2020-11-13 NOTE — TELEPHONE ENCOUNTER
Called pharmacy to determine if glyburide-metformin was available for patient to pickup and why there was a hold until 11/17. They informed patient can  medication today there is no hold. Called and spoke to patient he can pickup medication today.

## 2020-11-17 ENCOUNTER — HOSPITAL ENCOUNTER (OUTPATIENT)
Age: 35
Discharge: HOME | End: 2020-11-17
Attending: ORTHOPAEDIC SURGERY
Payer: COMMERCIAL

## 2020-11-17 PROCEDURE — 73721 MRI JNT OF LWR EXTRE W/O DYE: CPT

## 2020-11-19 DIAGNOSIS — M67.51 PLICA OF KNEE, RIGHT: ICD-10-CM

## 2020-11-24 ENCOUNTER — OFFICE VISIT (OUTPATIENT)
Dept: ORTHOPEDIC SURGERY | Age: 35
End: 2020-11-24
Payer: COMMERCIAL

## 2020-11-24 VITALS
WEIGHT: 178.2 LBS | TEMPERATURE: 96.8 F | HEIGHT: 66 IN | DIASTOLIC BLOOD PRESSURE: 79 MMHG | BODY MASS INDEX: 28.64 KG/M2 | HEART RATE: 66 BPM | RESPIRATION RATE: 96 BRPM | SYSTOLIC BLOOD PRESSURE: 128 MMHG

## 2020-11-24 DIAGNOSIS — M22.2X1 PATELLOFEMORAL PAIN SYNDROME OF RIGHT KNEE: Primary | ICD-10-CM

## 2020-11-24 PROCEDURE — 99214 OFFICE O/P EST MOD 30 MIN: CPT | Performed by: ORTHOPAEDIC SURGERY

## 2020-11-24 RX ORDER — MELOXICAM 15 MG/1
15 TABLET ORAL
Qty: 30 TAB | Refills: 1 | Status: SHIPPED | OUTPATIENT
Start: 2020-11-24 | End: 2021-01-06

## 2020-11-24 NOTE — PROGRESS NOTES
Haylee Lisseth Coker  1985   Chief Complaint   Patient presents with    Knee Pain     right knee pain        HISTORY OF PRESENT ILLNESS  Mary Lloyd is a 38 y.o. male who presents today for reevaluation of right knee and MRI review. Patient rates pain as 2/10 today. Pain has been present since 2006. No specific injury. Pt notes his right knee feels tight and \"locks up\" on him. The knee locks up on him at least 1 X week. 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 Pt has trouble with bending the knee. Pt also notes a popping sensation. Has tried PT years ago with minimal relief. Patient denies any fever, chills, chest pain, shortness of breath or calf pain. The remainder of the review of systems is negative. There are no new illness or injuries to report since last seen in the office. There are no changes to medications, allergies, family or social history. Pain Assessment  11/24/2020   Location of Pain Knee   Location Modifiers Right   Severity of Pain 2   Quality of Pain Dull   Quality of Pain Comment sore, stiffness   Duration of Pain Persistent   Frequency of Pain Constant   Aggravating Factors Stairs   Limiting Behavior No   Relieving Factors Nothing   Result of Injury No       PHYSICAL EXAM:   Visit Vitals  /79 (BP 1 Location: Left arm, BP Patient Position: Sitting)   Pulse 66   Temp 96.8 °F (36 °C) (Temporal)   Resp (!) 96   Ht 5' 6\" (1.676 m)   Wt 178 lb 3.2 oz (80.8 kg)   BMI 28.76 kg/m²     The patient is a well-developed, well-nourished male   in no acute distress. The patient is alert and oriented times three. The patient is alert and oriented times three. Mood and affect are normal.  LYMPHATIC: lymph nodes are not enlarged and are within normal limits  SKIN: normal in color and non tender to palpation. There are no bruises or abrasions noted. NEUROLOGICAL: Motor sensory exam is within normal limits. Reflexes are equal bilaterally. There is normal sensation to pinprick and light touch  MUSCULOSKELETAL:  Examination Right knee   Skin Intact   Range of motion 0-130   Effusion -   Medial joint line tenderness +   Lateral joint line tenderness -   Tenderness Pes Bursa -   Tenderness insertion MCL -   Tenderness insertion LCL -   Yousufs +   Patella crepitus +   Patella grind -   Lachman -   Pivot shift -   Anterior drawer -   Posterior drawer -   Varus stress -   Valgus stress -   Neurovascular Intact   Calf Swelling and Tenderness to Palpation -   Evelio's Test -   Hamstring Cord Tightness -        IMAGING: MRI of right knee dated 11/17/2020 was reviewed and read by Dr. Shantal Perry:   IMPRESSION:  1. A thin medial patellar plica is noted without surrounding inflammation. 2. Findings suggest chronic low-grade patellofemoral maltracking. 3. Low-grade patellar chondrosis, most notably of the medial patellar facet. 4. Mild Baker's cyst.        XR of right knee obtained in the office dated 11/12/2020 was reviewed and read by Dr. Shantal Perry: no acute abnormlities      IMPRESSION:      ICD-10-CM ICD-9-CM    1. Patellofemoral pain syndrome of right knee  M22.2X1 719.46 REFERRAL TO PHYSICAL THERAPY      meloxicam (Mobic) 15 mg tablet        PLAN:   1. Pt presents today with right knee pain due to MRI-documented patellofemoral syndrome and I would like for him to begin PT. Was also given a prescription for Mobic today. Risk factors include: dm, htn  2. No ultrasound exam indicated today  3. No cortisone injection indicated today   4. Yes Physical/Occupational Therapy indicated today  5. No diagnostic test indicated today:   6. No durable medical equipment indicated today  7. No referral indicated today   8. Yes medications indicated today: MOBIC  9.  No Narcotic indicated today      RTC 4 weeks      Scribed by Laura [place] [address]) as dictated by Olivia Ayers Paul Maher MD    I, Dr. Terrance Poe, confirm that all documentation is accurate.     Terrance Poe M.D.   [facility] [address] and Spine Specialist

## 2020-12-02 ENCOUNTER — HOSPITAL ENCOUNTER (OUTPATIENT)
Dept: PHYSICAL THERAPY | Age: 35
Discharge: HOME | End: 2020-12-02
Payer: COMMERCIAL

## 2020-12-02 PROCEDURE — 97162 PT EVAL MOD COMPLEX 30 MIN: CPT | Performed by: PHYSICAL THERAPIST

## 2020-12-02 PROCEDURE — 97110 THERAPEUTIC EXERCISES: CPT | Performed by: PHYSICAL THERAPIST

## 2020-12-02 NOTE — PROGRESS NOTES
[facility]              [address] [facility]        [place], [address]   [phone] fax    Plan of Care/ Statement of Necessity for Physical Therapy Services  Patient name: Bridger Cummings Start of Care: 2020   Referral source: Karla Olson,* : 1985    Medical Diagnosis: Pain in right knee [M25.561]  Payor: BLUE CROSS / Plan:  [facility] / Product Type: PPO /  Onset Date:Chronic since ; worse since 2020. Treatment Diagnosis: Right knee pain   Prior Hospitalization: see medical history Provider#: [id number]   Medications: Verified on Patient summary List    Comorbidities: Diabetes, GI Disease, HBP. Prior Level of Function: Independent. Goes to the gym 5 days per week for the past 3 weeks. The Plan of Care and following information is based on the information from the initial evaluation. Assessment/ key information: Patient with signs and symptoms consistent with right knee pain. He has altered patellar position with lateral tilt and glide which are accentuated with dynamic motion. He has good strength and ROM of the right knee and hip and ankle. He has decreased quad, hamstring and hip flexor flexibility which will need to be addressed. Functionally, he has reported some pain with activity but it does not prevent him from any normal activities. He denies tenderness to palpation about the right knee. Patient will benefit from a program of skilled physical therapy to include therapeutic exercises to address strength deficits, therapeutic activities to improve functional mobility, neuromuscular reeducation to address balance, coordination and proprioception, manual therapy to address ROM and tissue extensibility and modalities as indicated. All questions were answered.     Evaluation Complexity History MEDIUM  Complexity : 1-2 comorbidities / personal factors will impact the outcome/ POC ; Examination MEDIUM Complexity : 3 Standardized tests and measures addressing body structure, function, activity limitation and / or participation in recreation  ;Presentation MEDIUM Complexity : Evolving with changing characteristics  ; Clinical Decision Making MEDIUM Complexity : FOTO score of 26-74  Overall Complexity Rating: MEDIUM  Problem List: decrease activity tolerance, decrease flexibility/ joint mobility and other pain with and following activity. Treatment Plan may include any combination of the following: Therapeutic exercise, Therapeutic activities, Neuromuscular re-education, Physical agent/modality and Manual therapy  Patient / Family readiness to learn indicated by: asking questions, trying to perform skills and interest  Persons(s) to be included in education: patient (P)  Barriers to Learning/Limitations: None  Patient Goal (s): I would like to get rid of the constant dull ache in my knee and the snapping of my kneecap going back into place.   Patient Self Reported Health Status: good  Rehabilitation Potential: good    Short Term Goals: To be accomplished in 1 weeks:  1. Patient will become proficient in their HEP and will be compliant in performing that program.  Evaluation:   Patient given a written/illustrated HEP. Long Term Goals: To be accomplished in 4 weeks:  1. Patient's pain level will be 0/10-2/10 with activity in order to improve patient's ability to perform normal ADLs. Evaluation:  1/10-8/10.  2. Patient will demonstrate 0-130 degrees AROM right knee to increase ease of ADLs. Evaluation:  0-125.  3. Patient will increase FOTO score to 74 to indicate increased functional mobility. Evaluation:  61  4. Patient will report little difficulty with heavy activities at his home in order to increase his functional activity level. Evaluation:  Moderate difficulty. Frequency / Duration: Patient to be seen 2 times per week for 4 weeks.     Patient/ Caregiver education and instruction: Diagnosis, prognosis, exercises   [x]  Plan of care has been reviewed with LALA Jules, PT 12/2/2020 8:28 AM  ________________________________________________________________________    I certify that the above Therapy Services are being furnished while the patient is under my care. I agree with the treatment plan and certify that this therapy is necessary.     Physician's Signature:____________Date:_________TIME:________    ** Signature, Date and Time must be completed for valid certification **  Please sign and return to [facility]/ [address]              [address] [facility] [place], [address] [place] [address]   [phone] fax

## 2020-12-02 NOTE — PROGRESS NOTES
PT DAILY TREATMENT NOTE/KNEE EVAL     Patient Name: Mary Pod  Date:2020  : 1985  [x]  Patient  Verified  Payor: BLUE CROSS / Plan: [address] [place] [address] [employer] / Product Type: PPO /    In time:8:45  Out time:9:30  Total Treatment Time (min): 45  Visit #: 1 of 8    Medicare/BCBS Only   Total Timed Codes (min):  25 1:1 Treatment Time:  45     Treatment Area: Pain in right knee [M25.561]    SUBJECTIVE  Pain Level (0-10 scale): 1/10 now; 1/10 at best; 8/10 at worst.  [x]constant []intermittent []improving [x]worsening []no change since onset    Any medication changes, allergies to medications, adverse drug reactions, diagnosis change, or new procedure performed?: [x] No    [] Yes (see summary sheet for update)  Subjective functional status/changes:     PLOF: Goes to the gym 5 days per week for the past 3 weeks. Limitations to PLOF: Function is independent but notes pain afterwards. Mechanism of Injury: Originally injured his knee in . Most recent exacerbation two months ago. He was sitting for 10 minutes, got up and his knee gave out. Current symptoms/Complaints: Painful right knee, made worse on stairs, kneeling, squatting. Running after 1/2 mile it will ache. Symptoms relieved with rest.  Previous Treatment/Compliance: None. PMHx/Surgical Hx: Previous episodes of knee pain. Had PT in  for a flare up then. Did not help. 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 Pt Goals: \"I would like to get rid of the constant dull ache in my knee and the snapping of my kneecap going back into place. Barriers: [x]pain []financial [x]time []transportation []other  Cognition: A & O x 3    Other:    OBJECTIVE/EXAMINATION  Domestic Life: Lives with his family. Activity/Recreational Limitations: None at this time. Mobility: ambulates with normal so. Self Care: Independent.       20 min [x]Eval                  []Re-Eval       25 min Therapeutic Exercise:  [] See flow sheet :Empahsis on increasing right knee strength and stability. Rationale: increase ROM and increase strength to improve the patients ability to increase his functional activity level.             With   [] TE   [] TA   [] neuro   [] other: Patient Education: [x] Review HEP    [] Progressed/Changed HEP based on:   [] positioning   [] body mechanics   [] transfers   [] heat/ice application    [] other:      Other Objective/Functional Measures:     Physical Therapy Evaluation - Knee    Posture: [x] Varus    [] Valgus    [] Recurvatum        [] Tibial Torsion    [] Foot Supination    [] Foot Pronation    Describe:    Gait:  [x] Normal    [] Abnormal    [] Antalgic    [] NWB    Device:    Describe:    ROM / Strength  [] Unable to assess                  AROM                      PROM                   Strength (1-5)    Left Right Left Right Left Right   Hip Flexion  WNL    5    Extension  WNL        Abduction  WNL    5    Adduction  WNL       Knee Flexion 123 125    5    Extension 0 0    5                           Flexibility: [] Unable to assess at this time  Hamstrings:    (L) Tightness= [] WNL   [] Min   [x] Mod   [] Severe    (R) Tightness= [] WNL   [] Min   [x] Mod   [] Severe  Quadriceps:    (L) Tightness= [] WNL   [] Min   [x] Mod   [] Severe    (R) Tightness= [] WNL   [] Min   [x] Mod   [] Severe  Gastroc:      (L) Tightness= [x] WNL   [] Min   [] Mod   [] Severe    (R) Tightness= [x] WNL   [] Min   [] Mod   [] Severe  Other:    Palpation:   Neg/Pos  Neg/Pos  Neg/Pos   Joint Line (-) Quad tendon (-) Patellar ligament (-)   Patella (-) MCL/LCL (-) Pes Anserinus (-)   Popliteal fossa (-) Hamstring tendons (-) Infrapatellar fat pad (-)     Optional Tests:  Patellar Positioning (Static)   []L []R Normal []L [x]R Lateral   []L []R Paullette Memos      []L []R Medial   []L []R Baja    Patellar Tracking   []L [x]R Glide (Lat)   []L [x]R Tilt (Lat)     []L []R Glide (Med)  []L []R Tilt (Med)      []L []R Tile (Inf)     Patellar Mobility  WNL   []L []R Hypermobile []L []R Hypomobile           Lachmans  [x] Neg    [] Pos   Valgus@ 0 Degrees [x] Neg    [] Pos   Valgus@ 30 Degrees [x] Neg    [] Pos   Varus@ 0 Degrees [x] Neg    [] Pos   Varus@ 30 Degrees [x] Neg    [] Pos   Fawn's Test  [] Neg    [x] Pos  Gallo Test  [] Neg    [x] Pos  Patellar Compression [] Neg    [x] Pos  Yousuf-Amadou [x] Neg    [] Pos      Pain Level (0-10 scale) post treatment: 0    ASSESSMENT/Changes in Function: Patient with signs and symptoms consistent with right knee pain. He has altered patellar position with lateral tilt and glide which are accentuated with dynamic motion. He has good strength and ROM of the right knee and hip and ankle. He has decreased quad, hamstring and hip flexor flexibility which will need to be addressed. Functionally, he has reported some pain with activity but it does not prevent him from any normal activities. He denies tenderness to palpation about the right knee. Patient will continue to benefit from skilled PT services to modify and progress therapeutic interventions, address functional mobility deficits, analyze and address soft tissue restrictions, analyze and cue movement patterns, analyze and modify body mechanics/ergonomics and assess and modify postural abnormalities to attain remaining goals. [x]  See Plan of Care  []  See progress note/recertification  []  See Discharge Summary         Progress towards goals / Updated goals:  Short Term Goals: To be accomplished in 1 weeks:  1. Patient will become proficient in their HEP and will be compliant in performing that program.  Evaluation:   Patient given a written/illustrated HEP.     Long Term Goals: To be accomplished in 4 weeks:  1. Patient's pain level will be 0/10-2/10 with activity in order to improve patient's ability to perform normal ADLs.   Evaluation:  1/10-8/10.  2. Patient will demonstrate 0-130 degrees AROM right knee to increase ease of ADLs. Evaluation:  0-125.  3. Patient will increase FOTO score to 74 to indicate increased functional mobility. Evaluation:  61  4. Patient will report little difficulty with heavy activities at his home in order to increase his functional activity level. Evaluation:  Moderate difficulty.     PLAN  [x]  Upgrade activities as tolerated     [x]  Continue plan of care  []  Update interventions per flow sheet       []  Discharge due to:_  []  Other:_      Yamilex Valladares, PT 12/2/2020  8:30 AM

## 2020-12-07 ENCOUNTER — HOSPITAL ENCOUNTER (OUTPATIENT)
Dept: PHYSICAL THERAPY | Age: 35
Discharge: HOME | End: 2020-12-07
Payer: COMMERCIAL

## 2020-12-07 PROCEDURE — 97112 NEUROMUSCULAR REEDUCATION: CPT

## 2020-12-07 PROCEDURE — 97110 THERAPEUTIC EXERCISES: CPT

## 2020-12-07 PROCEDURE — 97140 MANUAL THERAPY 1/> REGIONS: CPT

## 2020-12-07 NOTE — PROGRESS NOTES
PT DAILY TREATMENT NOTE     Patient Name: Jennifer Conn  Date:2020  : 1985  [x]  Patient  Verified  Payor: BLUE CROSS / Plan: [facility] / Product Type: PPO /    In time:1016  Out time:1055  Total Treatment Time (min): 39  Visit #: 2 of 8    Medicare/BCBS Only   Total Timed Codes (min):  39 1:1 Treatment Time:  39       Treatment Area: Pain in right knee [M25.561]    SUBJECTIVE  Pain Level (0-10 scale): 0  Any medication changes, allergies to medications, adverse drug reactions, diagnosis change, or new procedure performed?: [x] No    [] Yes (see summary sheet for update)  Subjective functional status/changes:   [] No changes reported  Patient reports completion of HEP as prescribed without concern. Patient reports cessation of use of knee extension machine at the gym. OBJECTIVE    10 min Therapeutic Exercise:  [x] See flow sheet : Emphasis placed on improving available knee AROM and LE strength   Rationale: increase ROM and increase strength to improve the patients ability to improve ease with workout regime. 21 min Neuromuscular Re-education:  [x]  See flow sheet : Emphasis placed on improving activation and recruitment of the quadriceps musculature and improving LE proprioceptive and kinesthetic awareness   Rationale: increase ROM, increase strength, improve balance and increase proprioception  to improve the patients ability to improve ease with exercise. 8 min Manual Therapy:    Supine, Right Patella Superior Grade III-IV Mobilization (OPP)  Supine, Right Patella Superior Flora with SAQ AROM  Supine, Right SAQ with Concentric Resistance   The manual therapy interventions were performed at a separate and distinct time from the therapeutic activities interventions. Rationale: decrease pain, increase ROM and increase tissue extensibility to improve ease with stair management.           With   [] TE   [] TA   [] neuro   [] other: Patient Education: [x] Review HEP [] Progressed/Changed HEP based on:   [] positioning   [] body mechanics   [] transfers   [] heat/ice application    [] other:      Other Objective/Functional Measures:   Right Knee AROM = 0 - 135 degrees     Pain Level (0-10 scale) post treatment: 0    ASSESSMENT/Changes in Function: With TTP noted to the medial and lateral patellar border with reproduction of right medial patellar pain with performance of SAQ x5 repetitions with concentric manual resistance. With exercise performance emphasis placed on patellofemoral mechanics with promotion of superior patellar glide with knee extension. Patient with subjective report of fatigue without pain at cessation of treatment session. Patient will continue to benefit from skilled PT services to modify and progress therapeutic interventions, address functional mobility deficits, address ROM deficits, address strength deficits, analyze and address soft tissue restrictions, analyze and cue movement patterns, analyze and modify body mechanics/ergonomics, assess and modify postural abnormalities, address imbalance/dizziness and instruct in home and community integration to attain remaining goals. []  See Plan of Care  []  See progress note/recertification  []  See Discharge Summary         Progress towards goals / Updated goals:    Short Term Goals: To be accomplished in 1 weeks:  1.  Patient will become proficient in their HEP and will be compliant in performing that program.  Evaluation:   Patient given a written/illustrated HEP. Current: Met, HEP performance reported as prescribed, 12/7/2020     Long Term Goals: To be accomplished in 4 weeks:  1. Patient's pain level will be 0/10-2/10 with activity in order to improve patient's ability to perform normal ADLs. Evaluation:  1/10-8/10.  2. Patient will demonstrate 0-130 degrees AROM right knee to increase ease of ADLs. Evaluation:  0-125. Current: Met, Right Knee AROM = 0 - 135 degrees, 12/7/2020  3.  Patient will increase FOTO score to 74 to indicate increased functional mobility. Evaluation: FOTO = 61  4. Patient will report little difficulty with heavy activities at his home in order to increase his functional activity level. Evaluation:  Moderate difficulty.     PLAN  [x]  Upgrade activities as tolerated     [x]  Continue plan of care  []  Update interventions per flow sheet       []  Discharge due to:_  []  Other:_      Cathy Finch, PT 12/7/2020  7:06 AM    Future Appointments   Date Time Provider Richar Prakash   12/7/2020 10:15 AM [place], [address] [facility]   12/10/2020 10:15 AM Nataly Mittal, PT [facility]   12/15/2020  9:30 AM Nataly Mittal, PT [facility]   12/16/2020  9:30 AM Nataly Mittal, PT [facility]   12/22/2020 11:00 AM Marion Kimball MD [facility]   12/23/2020 10:15 AM Nataly Mittal, PT [facility]   12/28/2020 10:15 AM Sari Antoine, PT [facility]   12/31/2020 10:15 AM Nataly Mittal, PT [facility]   1/6/2021 10:00 AM Teresa Ochoa, [facility] BS AM[facility]

## 2020-12-10 ENCOUNTER — HOSPITAL ENCOUNTER (OUTPATIENT)
Dept: PHYSICAL THERAPY | Age: 35
Discharge: HOME | End: 2020-12-10
Payer: COMMERCIAL

## 2020-12-10 PROCEDURE — 97110 THERAPEUTIC EXERCISES: CPT | Performed by: PHYSICAL THERAPIST

## 2020-12-10 PROCEDURE — 97112 NEUROMUSCULAR REEDUCATION: CPT | Performed by: PHYSICAL THERAPIST

## 2020-12-10 PROCEDURE — 97140 MANUAL THERAPY 1/> REGIONS: CPT | Performed by: PHYSICAL THERAPIST

## 2020-12-10 NOTE — PROGRESS NOTES
PT DAILY TREATMENT NOTE     Patient Name: Marika Sarah  Date:12/10/2020  : 1985  [x]  Patient  Verified  Payor: BLUE CROSS / Plan: [facility] / Product Type: PPO /    In time:10:15  Out time:11:00  Total Treatment Time (min): 45  Visit #: 3 of 8    Medicare/BCBS Only   Total Timed Codes (min):  45 1:1 Treatment Time:  45       Treatment Area: Pain in right knee [M25.561]    SUBJECTIVE  Pain Level (0-10 scale): 0  Any medication changes, allergies to medications, adverse drug reactions, diagnosis change, or new procedure performed?: [x] No    [] Yes (see summary sheet for update)  Subjective functional status/changes:   [] No changes reported  Patient notes continued pain on stairs but relates that it seems to be improving. OBJECTIVE    10 min Therapeutic Exercise:  [] See flow sheet :Emphasis placed on improving available knee AROM and LE strength   Rationale: increase ROM and increase strength to improve the patients ability to increase ease with workout routine. 27 min Neuromuscular Re-education:  []  See flow sheet :Emphasis placed on improving activation and recruitment of the quadriceps musculature and improving LE proprioceptive and kinesthetic awareness   Rationale: increase strength, improve coordination, improve balance and increase proprioception  to improve the patients ability to increase ease with functional activity. 8 min Manual Therapy:  Right knee patellar mobs, grade III-IV. Manual stretching HS and Quads. The manual therapy interventions were performed at a separate and distinct time from the therapeutic activities interventions. Rationale: increase ROM and increase tissue extensibility to increase ease of motion to improve function.           With   [] TE   [] TA   [] neuro   [] other: Patient Education: [x] Review HEP    [] Progressed/Changed HEP based on:   [] positioning   [] body mechanics   [] transfers   [] heat/ice application    [] other: Other Objective/Functional Measures: Gait is without deviation. He has mild tightness in quads and hamstrings. Pain Level (0-10 scale) post treatment: 0    ASSESSMENT/Changes in Function: Patient with decreased subjective c/o pain in the right knee. Patient will continue to benefit from skilled PT services to modify and progress therapeutic interventions, address functional mobility deficits, address ROM deficits, address strength deficits, analyze and address soft tissue restrictions, analyze and cue movement patterns, analyze and modify body mechanics/ergonomics, assess and modify postural abnormalities, address imbalance/dizziness and instruct in home and community integration to attain remaining goals. [x]  See Plan of Care  []  See progress note/recertification  []  See Discharge Summary         Progress towards goals / Updated goals:  Short Term Goals: To be accomplished in 1 weeks:  1.  Patient will become proficient in their HEP and will be compliant in performing that program.  Evaluation:   Patient given a written/illustrated HEP. Current: Met, HEP performance reported as prescribed, 12/7/2020     Long Term Goals: To be accomplished in 4 weeks:  1. Patient's pain level will be 0/10-2/10 with activity in order to improve patient's ability to perform normal ADLs. Evaluation:  1/10-8/10. Current:  0/10-5/10.  12/10/2020. Progressing. 2. Patient will demonstrate 0-130 degrees AROM right knee to increase ease of ADLs. Evaluation:  0-125. Current: Met, Right Knee AROM = 0 - 135 degrees, 12/7/2020  3. Patient will increase FOTO score to 74 to indicate increased functional mobility. Evaluation: FOTO = 61  4. Patient will report little difficulty with heavy activities at his home in order to increase his functional activity level. Evaluation:  Moderate difficulty.     PLAN  [x]  Upgrade activities as tolerated     [x]  Continue plan of care  []  Update interventions per flow sheet       [] Discharge due to:_  []  Other:_      Tamara Lynn, PT 12/10/2020  10:22 AM    Future Appointments   Date Time Provider Richar Prakash   12/15/2020  9:30 AM Jaiden Barakat, PT [facility]   12/16/2020  9:30 AM Jaiden Barakat, PT [facility]   12/22/2020 11:00 AM Kalpana Baumann MD [facility] BS AMB   12/23/2020 10:15 AM Jaiden Barakat, PT [facility]   12/28/2020 10:15 AM Tj Holt, PT [facility]   12/31/2020 10:15 AM Jaiden Barakat, PT [facility]   1/6/2021 10:00 AM Andressa Ochoa, NP [facility] BS AMB

## 2020-12-15 ENCOUNTER — APPOINTMENT (OUTPATIENT)
Dept: PHYSICAL THERAPY | Age: 35
End: 2020-12-15
Payer: COMMERCIAL

## 2020-12-16 ENCOUNTER — APPOINTMENT (OUTPATIENT)
Dept: PHYSICAL THERAPY | Age: 35
End: 2020-12-16
Payer: COMMERCIAL

## 2020-12-23 ENCOUNTER — HOSPITAL ENCOUNTER (OUTPATIENT)
Dept: PHYSICAL THERAPY | Age: 35
Discharge: HOME | End: 2020-12-23
Payer: COMMERCIAL

## 2020-12-23 PROCEDURE — 97140 MANUAL THERAPY 1/> REGIONS: CPT | Performed by: PHYSICAL THERAPIST

## 2020-12-23 PROCEDURE — 97110 THERAPEUTIC EXERCISES: CPT | Performed by: PHYSICAL THERAPIST

## 2020-12-23 PROCEDURE — 97112 NEUROMUSCULAR REEDUCATION: CPT | Performed by: PHYSICAL THERAPIST

## 2020-12-23 NOTE — PROGRESS NOTES
PT DAILY TREATMENT NOTE     Patient Name: Antoine Cabrera  HVTR:  : 1985  [x]  Patient  Verified  Payor: BLUE CROSS / Plan: [employer] [facility] / Product Type: PPO /    In time:10:14  Out time:10:58  Total Treatment Time (min): 44  Visit #: 4 of 8    Medicare/BCBS Only   Total Timed Codes (min):  44 1:1 Treatment Time:  44       Treatment Area: Pain in right knee [M25.561]    SUBJECTIVE  Pain Level (0-10 scale): 0  Any medication changes, allergies to medications, adverse drug reactions, diagnosis change, or new procedure performed?: [x] No    [] Yes (see summary sheet for update)  Subjective functional status/changes:   [] No changes reported  Patient has missed some therapy appointments due to an illness in his family. Suspected Covid 19 was negative. He is tired today as he worked last night. Noted most pain has been 3-4/10, he was at the gym last week after working out. OBJECTIVE    10 min Therapeutic Exercise:  [] See flow sheet :Emphasis placed on improving available knee AROM and LE strength   Rationale: increase ROM and increase strength to improve the patients ability to increase ease with workout routine. 26 min Neuromuscular Re-education:  []  See flow sheet :Emphasis placed on improving activation and recruitment of the quadriceps musculature and improving LE proprioceptive and kinesthetic awareness   Rationale: increase strength, improve coordination, improve balance and increase proprioception  to improve the patients ability to increase ease with functional activity. 8 min Manual Therapy:  Right knee patellar mobs, grade III-IV. Manual stretching HS and Quads. The manual therapy interventions were performed at a separate and distinct time from the therapeutic activities interventions. Rationale: increase ROM and increase tissue extensibility to increase ease of motion to improve function.             With   [] TE   [] TA   [] neuro   [] other: Patient Education: [x] Review HEP    [] Progressed/Changed HEP based on:   [] positioning   [] body mechanics   [] transfers   [] heat/ice application    [] other:      Other Objective/Functional Measures: Good patellar mobility. Tight quads. Pain Level (0-10 scale) post treatment: 0    ASSESSMENT/Changes in Function: Patient with decreased pain, he has progressed to doing gym exercises. Patient will continue to benefit from skilled PT services to modify and progress therapeutic interventions, address functional mobility deficits, address ROM deficits, address strength deficits, analyze and address soft tissue restrictions, analyze and cue movement patterns, analyze and modify body mechanics/ergonomics, assess and modify postural abnormalities, address imbalance/dizziness and instruct in home and community integration to attain remaining goals. [x]  See Plan of Care  []  See progress note/recertification  []  See Discharge Summary         Progress towards goals / Updated goals:  Short Term Goals: To be accomplished in 1 weeks:  1.  Patient will become proficient in their HEP and will be compliant in performing that program.  Evaluation:   Patient given a written/illustrated HEP. Current: Met, HEP performance reported as prescribed, 12/7/2020     Long Term Goals: To be accomplished in 4 weeks:  1. Patient's pain level will be 0/10-2/10 with activity in order to improve patient's ability to perform normal ADLs. Evaluation:  1/10-8/10. Current:  0/10-4/10.  12/23/2020. Progressing. 2. Patient will demonstrate 0-130 degrees AROM right knee to increase ease of ADLs. Evaluation:  0-125. Current: Met, Right Knee AROM = 0 - 135 degrees, 12/7/2020  3. Patient will increase FOTO score to 74 to indicate increased functional mobility. Evaluation: FOTO = 61  4. Patient will report little difficulty with heavy activities at his home in order to increase his functional activity level.   Evaluation:  Moderate difficulty.     PLAN  [x]  Upgrade activities as tolerated     [x]  Continue plan of care  []  Update interventions per flow sheet       []  Discharge due to:_  []  Other:_      Lawrence Casiano, PT 12/23/2020  10:00 AM    Future Appointments   Date Time Provider Richar Olinda   12/23/2020 10:15 AM Yovanny Min, NORM [facility]   12/28/2020 10:15 AM Dexter, [address] [facility]   12/29/2020  8:50 AM Evan Holt MD [facility] BS AM[facility]   12/31/2020 10:15 AM [facility]   1/6/2021 10:00 AM Kevin Ochoa, PRAVIN [facility] BS AMB

## 2020-12-28 ENCOUNTER — HOSPITAL ENCOUNTER (OUTPATIENT)
Dept: PHYSICAL THERAPY | Age: 35
End: 2020-12-28
Payer: COMMERCIAL

## 2020-12-30 ENCOUNTER — OFFICE VISIT (OUTPATIENT)
Dept: ORTHOPEDIC SURGERY | Age: 35
End: 2020-12-30
Payer: COMMERCIAL

## 2020-12-30 VITALS
OXYGEN SATURATION: 98 % | SYSTOLIC BLOOD PRESSURE: 138 MMHG | HEART RATE: 70 BPM | RESPIRATION RATE: 16 BRPM | BODY MASS INDEX: 29.09 KG/M2 | WEIGHT: 181 LBS | TEMPERATURE: 96.8 F | HEIGHT: 66 IN | DIASTOLIC BLOOD PRESSURE: 93 MMHG

## 2020-12-30 DIAGNOSIS — M22.2X1 PATELLOFEMORAL PAIN SYNDROME OF RIGHT KNEE: Primary | ICD-10-CM

## 2020-12-30 PROCEDURE — 99214 OFFICE O/P EST MOD 30 MIN: CPT | Performed by: PHYSICIAN ASSISTANT

## 2020-12-30 NOTE — PROGRESS NOTES
Kris Coker  1985   Chief Complaint   Patient presents with    Knee Pain     right knee pain        HISTORY OF PRESENT ILLNESS  Javier Wooten is a 38 y.o. male who presents today for reevaluation of right knee. Patient rates pain as 1/10 today. Pain has been present since 2006. No specific injury. 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 Has been going to PT with relief. Has also been taking Mobic. He states his knee is doing better today, notes overall improvement. Patient denies any fever, chills, chest pain, shortness of breath or calf pain. The remainder of the review of systems is negative. There are no new illness or injuries to report since last seen in the office. There are no changes to medications, allergies, family or social history. Pain Assessment  12/30/2020   Location of Pain Knee   Location Modifiers Right   Severity of Pain 1   Quality of Pain Aching   Quality of Pain Comment -   Duration of Pain -   Frequency of Pain Intermittent   Aggravating Factors Stairs   Limiting Behavior No   Relieving Factors Nothing   Result of Injury No       PHYSICAL EXAM:   Visit Vitals  BP (!) 138/93 (BP 1 Location: Left arm, BP Patient Position: Sitting)   Pulse 70   Temp 96.8 °F (36 °C) (Temporal)   Resp 16   Ht 5' 6\" (1.676 m)   Wt 181 lb (82.1 kg)   SpO2 98%   BMI 29.21 kg/m²     The patient is a well-developed, well-nourished male   in no acute distress. The patient is alert and oriented times three. The patient is alert and oriented times three. Mood and affect are normal.  LYMPHATIC: lymph nodes are not enlarged and are within normal limits  SKIN: normal in color and non tender to palpation. There are no bruises or abrasions noted. NEUROLOGICAL: Motor sensory exam is within normal limits. Reflexes are equal bilaterally.  There is normal sensation to pinprick and light touch  MUSCULOSKELETAL:  Examination Right knee   Skin Intact   Range of motion 0-130   Effusion -   Medial joint line tenderness -   Lateral joint line tenderness -   Tenderness Pes Bursa -   Tenderness insertion MCL -   Tenderness insertion LCL -   Yousufs -   Patella crepitus -   Patella grind -   Lachman -   Pivot shift -   Anterior drawer -   Posterior drawer -   Varus stress -   Valgus stress -   Neurovascular Intact   Calf Swelling and Tenderness to Palpation -   Evelio's Test -   Hamstring Cord Tightness -        IMAGING: MRI of right knee dated 11/17/2020 was reviewed and read by Dr. Shantal Perry:   IMPRESSION:  1. A thin medial patellar plica is noted without surrounding inflammation. 2. Findings suggest chronic low-grade patellofemoral maltracking. 3. Low-grade patellar chondrosis, most notably of the medial patellar facet. 4. Mild Baker's cyst.        XR of right knee obtained in the office dated 11/12/2020 was reviewed and read by Dr. Shantal Perry: no acute abnormlities      IMPRESSION:      ICD-10-CM ICD-9-CM    1. Patellofemoral pain syndrome of right knee  M22.2X1 719.46         PLAN:   1. Pt presents today with right knee pain due to MRI-documented patellofemoral syndrome and notes overall improvement with PT. He can continue with PT and transition to physician-directed exercise program when ready. Continue taking Meloxicam as needed and continue with hamstring stretches. Follow up as needed. Risk factors include: dm, htn  2. No ultrasound exam indicated today  3. No cortisone injection indicated today   4. No Physical/Occupational Therapy indicated today  5. No diagnostic test indicated today:   6. No durable medical equipment indicated today  7. No referral indicated today   8. No medications indicated today  9.  No Narcotic indicated today      RTC prn      Scribed by [place] [id number]) as dictated by FERNY Faulkner PA-C  [employer] [facility]

## 2020-12-31 ENCOUNTER — APPOINTMENT (OUTPATIENT)
Dept: PHYSICAL THERAPY | Age: 35
End: 2020-12-31
Payer: COMMERCIAL

## 2021-01-06 ENCOUNTER — OFFICE VISIT (OUTPATIENT)
Dept: FAMILY MEDICINE CLINIC | Age: 36
End: 2021-01-06
Payer: COMMERCIAL

## 2021-01-06 VITALS
DIASTOLIC BLOOD PRESSURE: 77 MMHG | HEIGHT: 67 IN | TEMPERATURE: 99 F | RESPIRATION RATE: 24 BRPM | WEIGHT: 180 LBS | HEART RATE: 70 BPM | BODY MASS INDEX: 28.25 KG/M2 | OXYGEN SATURATION: 99 % | SYSTOLIC BLOOD PRESSURE: 130 MMHG

## 2021-01-06 DIAGNOSIS — Z13.29 SCREENING FOR ENDOCRINE, METABOLIC AND IMMUNITY DISORDER: Primary | ICD-10-CM

## 2021-01-06 DIAGNOSIS — Z13.0 SCREENING FOR ENDOCRINE, METABOLIC AND IMMUNITY DISORDER: Primary | ICD-10-CM

## 2021-01-06 DIAGNOSIS — E78.2 MIXED HYPERLIPIDEMIA: ICD-10-CM

## 2021-01-06 DIAGNOSIS — M22.2X1 PATELLOFEMORAL PAIN SYNDROME OF RIGHT KNEE: ICD-10-CM

## 2021-01-06 DIAGNOSIS — E11.9 TYPE 2 DIABETES MELLITUS WITHOUT COMPLICATION, WITHOUT LONG-TERM CURRENT USE OF INSULIN (HCC): ICD-10-CM

## 2021-01-06 DIAGNOSIS — Z13.228 SCREENING FOR ENDOCRINE, METABOLIC AND IMMUNITY DISORDER: Primary | ICD-10-CM

## 2021-01-06 LAB — HBA1C MFR BLD HPLC: 7.6 %

## 2021-01-06 PROCEDURE — 83036 HEMOGLOBIN GLYCOSYLATED A1C: CPT | Performed by: NURSE PRACTITIONER

## 2021-01-06 PROCEDURE — 99213 OFFICE O/P EST LOW 20 MIN: CPT | Performed by: NURSE PRACTITIONER

## 2021-01-06 RX ORDER — MELOXICAM 15 MG/1
15 TABLET ORAL AS NEEDED
Qty: 30 TAB | Refills: 1 | Status: SHIPPED | OUTPATIENT
Start: 2021-01-06 | End: 2021-03-30

## 2021-01-06 RX ORDER — ROSUVASTATIN CALCIUM 10 MG/1
10 TABLET, COATED ORAL
Qty: 90 TAB | Refills: 3 | Status: SHIPPED | OUTPATIENT
Start: 2021-01-06 | End: 2021-07-19 | Stop reason: SDUPTHER

## 2021-01-06 RX ORDER — GLYBURIDE-METFORMIN HYDROCHLORIDE 5; 500 MG/1; MG/1
TABLET ORAL
Qty: 180 TAB | Refills: 0 | Status: SHIPPED | OUTPATIENT
Start: 2021-01-06 | End: 2021-04-30

## 2021-01-06 NOTE — PROGRESS NOTES
OFFICE NOTE    Adalberto Wooten is a 38 y.o. male presenting today for the following:  Chief Complaint   Patient presents with    Follow Up Chronic Condition     diabetes      HPI     Diabetes  Patient is currently on glyburide/metformin 5-500 BID and Jardiance 10 mg tab. His last A1C in the office was 7.2% (he had came down from 9.7% previously). Todays A1c today is 7.6. Denies plyuria/dipis/phagia. Patient is still continuing to learn and live a healthier lifestyle by diet and exercise. Will continue on current regimen. Chronic knee pain  Patient is being followed by Orthopedics (Dr. Esteban العراقي) and place patient on meloxicam 15 mg PRN (originally was taking it daily). Patient states he is doing well and the Meloxicam is helping. MRI results:  IMPRESSION:  1. A thin medial patellar plica is noted without surrounding inflammation. 2. Findings suggest chronic low-grade patellofemoral maltracking. 3. Low-grade patellar chondrosis, most notably of the medial patellar facet. 4. Mild Baker's cyst.        HM  Flu - Patient states he had completed at [facility] and will bring in copy on next visit. Will discuss care gaps next visit. Review of Systems   Constitutional: Negative for chills, fatigue and fever. Respiratory: Negative for cough, chest tightness and shortness of breath. Cardiovascular: Negative for chest pain, palpitations and leg swelling. Musculoskeletal: Negative. Skin: Negative. Neurological: Negative. Psychiatric/Behavioral: Negative.           History  Past Medical History:   Diagnosis Date    Diabetes ([facility] [address] 01/2019    Hyperlipidemia 01/2019    Hypertension     intermittently for a few years    Irritable bowel syndrome        Past Surgical History:   Procedure Laterality Date    HX ORTHOPAEDIC      hammer toe surgery    HX WISDOM TEETH EXTRACTION         Social History     Socioeconomic History    Marital status:      Spouse name: Not on file    Number of children: Not on file    Years of education: Not on file    Highest education level: Not on file   Occupational History    Not on file   Social Needs    Financial resource strain: Not on file    Food insecurity     Worry: Not on file     Inability: Not on file    Transportation needs     Medical: Not on file     Non-medical: Not on file   Tobacco Use    Smoking status: Current Some Day Smoker     Types: Cigars    Smokeless tobacco: Current User     Types: Chew    Tobacco comment: maybe once per week    Substance and Sexual Activity    Alcohol use: Yes     Comment: occasional    Drug use: No    Sexual activity: Not on file   Lifestyle    Physical activity     Days per week: Not on file     Minutes per session: Not on file    Stress: Not on file   Relationships    Social connections     Talks on phone: Not on file     Gets together: Not on file     Attends Restorationist service: Not on file     Active member of club or organization: Not on file     Attends meetings of clubs or organizations: Not on file     Relationship status: Not on file    Intimate partner violence     Fear of current or ex partner: Not on file     Emotionally abused: Not on file     Physically abused: Not on file     Forced sexual activity: Not on file   Other Topics Concern    Not on file   Social History Narrative    Not on file       No Known Allergies    Current Outpatient Medications   Medication Sig Dispense Refill    empagliflozin (Jardiance) 10 mg tablet TAKE 1 TABLET BY MOUTH EVERY DAY 90 Tab 0    glyBURIDE-metFORMIN (GLUCOVANCE) 5-500 mg per tablet TAKE 1 TABLET BY MOUTH TWICE A DAY WITH MEALS 180 Tab 0    rosuvastatin (CRESTOR) 10 mg tablet Take 1 Tab by mouth nightly. 90 Tab 3    meloxicam (Mobic) 15 mg tablet Take 1 Tab by mouth as needed for Pain.  30 Tab 1    glucose blood VI test strips (ASCENSIA AUTODISC VI, ONE TOUCH ULTRA TEST VI) strip Check blood sugar three times a day after each meal *ONE TOUCH ULTRA BLUE* 100 Strip 5    Blood-Glucose Meter monitoring kit Check blood sugar three times a day after meals 1 Kit 0    lancets misc Check blood sugar three times a day after each meal 100 Each 0    aspirin delayed-release 81 mg tablet Take  by mouth daily. Patient Care Team:  Patient Care Team:  Tanner Wahl NP as PCP - General (Nurse Practitioner)  Tanner Wahl NP as PCP - [address] [place], [place] [address]maya Oneill Provider      LABS:  No results found for any visits on 01/06/21. RADIOLOGY:  No recent results      Physical Exam  Constitutional:       Appearance: Normal appearance. He is well-developed. Neck:      Musculoskeletal: Normal range of motion. Cardiovascular:      Rate and Rhythm: Normal rate and regular rhythm. Pulses: Normal pulses. Heart sounds: Normal heart sounds. Pulmonary:      Effort: Pulmonary effort is normal.   Lymphadenopathy:      Cervical: No cervical adenopathy. Neurological:      Mental Status: He is alert and oriented to person, place, and time. Psychiatric:         Mood and Affect: Mood normal.           Vitals:    01/06/21 1008   BP: 130/77   Pulse: 70   Resp: 24   Temp: 99 °F (37.2 °C)   TempSrc: Temporal   SpO2: 99%   Weight: 180 lb (81.6 kg)   Height: 5' 7\" (1.702 m)   PainSc:   0 - No pain         Assessment and Plan    1. Type 2 diabetes mellitus without complication, without long-term current use of insulin (HCC)  - empagliflozin (Jardiance) 10 mg tablet; TAKE 1 TABLET BY MOUTH EVERY DAY  Dispense: 90 Tab; Refill: 0  - glyBURIDE-metFORMIN (GLUCOVANCE) 5-500 mg per tablet; TAKE 1 TABLET BY MOUTH TWICE A DAY WITH MEALS  Dispense: 180 Tab; Refill: 0    2. Mixed hyperlipidemia  - rosuvastatin (CRESTOR) 10 mg tablet; Take 1 Tab by mouth nightly. Dispense: 90 Tab; Refill: 3    3. Screening for endocrine, metabolic and immunity disorder  - METABOLIC PANEL, COMPREHENSIVE; Future  - LIPID PANEL; Future  - CBC W/O DIFF; Future    4.  Patellofemoral pain syndrome of right knee  - meloxicam (Mobic) 15 mg tablet; Take 1 Tab by mouth as needed for Pain. Dispense: 30 Tab; Refill: 1      MDM    Procedures      *Plan of care reviewed with patient. Patient in agreement with plan and expresses understanding. All questions answered and patient encouraged to call or RTO if further questions or concerns. Advised patient if symptoms worsen to go to nearest ER or call 911. AVS and recommendations given to patient upon discharge.

## 2021-01-06 NOTE — PROGRESS NOTES
Suellen Reilly presents today for   Chief Complaint   Patient presents with    Follow Up Chronic Condition     diabetes       Is someone accompanying this pt? no    Is the patient using any DME equipment during OV? no    Depression Screening:  3 most recent PHQ Screens 1/6/2021   Little interest or pleasure in doing things Not at all   Feeling down, depressed, irritable, or hopeless Not at all   Total Score PHQ 2 0   Trouble falling or staying asleep, or sleeping too much Not at all   Feeling tired or having little energy Not at all   Poor appetite, weight loss, or overeating Not at all   Feeling bad about yourself - or that you are a failure or have let yourself or your family down Not at all   Trouble concentrating on things such as school, work, reading, or watching TV Not at all   Moving or speaking so slowly that other people could have noticed; or the opposite being so fidgety that others notice Not at all   Thoughts of being better off dead, or hurting yourself in some way Not at all   PHQ 9 Score 0   How difficult have these problems made it for you to do your work, take care of your home and get along with others Not difficult at all       Learning Assessment:  Learning Assessment 2/8/2019   PRIMARY LEARNER Patient   HIGHEST LEVEL OF EDUCATION - PRIMARY LEARNER  [id number] Oziel Vivas PRIMARY LEARNER NONE   CO-LEARNER CAREGIVER -   PRIMARY LANGUAGE ENGLISH   LEARNER PREFERENCE PRIMARY DEMONSTRATION   ANSWERED BY patient   RELATIONSHIP SELF       Abuse Screening:  Abuse Screening Questionnaire 1/11/2019   Do you ever feel afraid of your partner? N   Are you in a relationship with someone who physically or mentally threatens you? N   Is it safe for you to go home? Y       Fall Risk  No flowsheet data found. Health Maintenance reviewed and discussed and ordered per Provider.     Health Maintenance Due   Topic Date Due    Pneumococcal 0-64 years (1 of 1 - PPSV23) 08/20/1989    DTaP/Tdap/Td series (1 - Tdap) 08/20/2004    Flu Vaccine (1) 09/01/2020    Lipid Screen  10/01/2020   . Coordination of Care:  1. Have you been to the ER, urgent care clinic since your last visit? Hospitalized since your last visit? no    2. Have you seen or consulted any other health care providers outside of the [address] [place] [address] since your last visit? Include any pap smears or colon screening.  no

## 2021-01-06 NOTE — PATIENT INSTRUCTIONS
Diabetes Blood Sugar Emergencies: Your Action Plan How can you prevent a blood sugar emergency? An important part of living with diabetes is keeping your blood sugar in your target range. You'll need to know what to do if it's too high or too low. Managing your blood sugar levels helps you avoid emergencies. This care sheet will teach you about the signs of high and low blood sugar. It will help you make an action plan with your doctor for when these signs occur. Low blood sugar is more likely to happen if you take certain medicines for diabetes. It can also happen if you skip a meal, drink alcohol, or exercise more than usual. 
You may get high blood sugar if you eat differently than you normally do. One example is eating more carbohydrate than usual. Having a cold, the flu, or other sudden illness can also cause high blood sugar levels. Levels can also rise if you miss a dose of medicine. Any change in how you take your medicine may affect your blood sugar level. So it's important to work with your doctor before you make any changes. Check your blood sugar Work with your doctor to fill in the blank spaces below that apply to you. Track your levels, know your target range, and write down ways you can get your blood sugar back in your target range. A log book can help you track your levels. Take the book to all of your medical appointments. · Check your blood sugar _____ times a day, at these times:________________________________________________. (For example: Before meals, at bedtime, before exercise, during exercise, other.) · Your blood sugar target range before a meal is ___________________. Your blood sugar target range after a meal is _______________________. · Do this___________________________________________________to get your blood sugar back within your safe range if your blood sugar results are _________________________________________. (For example: Less than 70 or above 250 mg/dL. ) Call your doctor when your blood sugar results are ___________________________________. (For example: Less than 70 or above 250 mg/dL.) What are the symptoms of low and high blood sugar? Common symptoms of low blood sugar are sweating and feeling shaky, weak, hungry, or confused. Symptoms can start quickly. Common symptoms of high blood sugar are feeling very thirsty or very hungry. You may also pass urine more often than usual. You may have blurry vision and may lose weight without trying. But some people may have high or low blood sugar without having any symptoms. That's a good reason to check your blood sugar on a regular schedule. What should you do if you have symptoms? Work with your doctor to fill in the blank spaces below that apply to you. Low blood sugar If you have symptoms of low blood sugar, check your blood sugar. If it's below _____ ( for example, below 70), eat or drink a quick-sugar food that has about 15 grams of carbohydrate. Your goal is to get your level back to your safe range. Check your blood sugar again 15 minutes later. If it's still not in your target range, take another 15 grams of carbohydrate and check your blood sugar again in 15 minutes. Repeat this until you reach your target. Then go back to your regular testing schedule. Children usually need less than 15 grams of carbohydrate. Check with your doctor or diabetes educator for the amount that is right for your child. When you have low blood sugar, it's best to stop or reduce any physical activity until your blood sugar is back in your target range and is stable. If you must stay active, eat or drink 30 grams of carbohydrate. Then check your blood sugar again in 15 minutes. If it's not in your target range, take another 30 grams of carbohydrates. Check your blood sugar again in 15 minutes. Keep doing this until you reach your target. You can then go back to your regular testing schedule. If your symptoms or blood sugar levels are getting worse or have not improved after 15 minutes, seek medical care right away. Here are some examples of quick-sugar foods with 15 grams of carbohydrate: · 3 or 4 glucose tablets · 1 tablespoon (3 teaspoons) table sugar · ½ cup to ¾ cup (4 to 6 ounces) of fruit juice or regular (not diet) soda · Hard candy (such as 6 Life Savers) High blood sugar If you have symptoms of high blood sugar, check your blood sugar. Your goal is to get your level back to your target range. If it's above ______ ( for example, above 250), follow these steps: · If you missed a dose of your diabetes medicine, take it now. Take only the amount of medicine that you have been prescribed. Do not take more or less medicine. · Give yourself insulin if your doctor has prescribed it for high blood sugar. · Test for ketones, if the doctor told you to do so. If the results of the ketone test show a moderate-to-large amount of ketones, call the doctor for advice. · Wait 30 minutes after you take the extra insulin or the missed medicine. Check your blood sugar again. If your symptoms or blood sugar levels are getting worse or have not improved after taking these steps, seek medical care right away. Follow-up care is a key part of your treatment and safety. Be sure to make and go to all appointments, and call your doctor if you are having problems. It's also a good idea to know your test results and keep a list of the medicines you take. Where can you learn more? Go to http://www.gray.com/ Enter E362 in the search box to learn more about \"Diabetes Blood Sugar Emergencies: Your Action Plan. \" Current as of: December 20, 2019               Content Version: 12.6 © 4644-2709 [employer]. Care instructions adapted under license by [employer] (which disclaims liability or warranty for this information). If you have questions about a medical condition or this instruction, always ask your healthcare professional. [address] any warranty or liability for your use of this information.

## 2021-01-07 ENCOUNTER — LAB ONLY (OUTPATIENT)
Dept: FAMILY MEDICINE CLINIC | Age: 36
End: 2021-01-07
Payer: COMMERCIAL

## 2021-01-07 ENCOUNTER — HOSPITAL ENCOUNTER (OUTPATIENT)
Dept: LAB | Age: 36
Discharge: HOME | End: 2021-01-07
Payer: COMMERCIAL

## 2021-01-07 DIAGNOSIS — Z13.228 SCREENING FOR ENDOCRINE, METABOLIC AND IMMUNITY DISORDER: ICD-10-CM

## 2021-01-07 DIAGNOSIS — Z01.89 ENCOUNTER FOR LABORATORY EXAMINATION: Primary | ICD-10-CM

## 2021-01-07 DIAGNOSIS — Z13.29 SCREENING FOR ENDOCRINE, METABOLIC AND IMMUNITY DISORDER: ICD-10-CM

## 2021-01-07 DIAGNOSIS — Z13.0 SCREENING FOR ENDOCRINE, METABOLIC AND IMMUNITY DISORDER: ICD-10-CM

## 2021-01-07 DIAGNOSIS — E11.65 UNCONTROLLED TYPE 2 DIABETES MELLITUS WITH HYPERGLYCEMIA (HCC): ICD-10-CM

## 2021-01-07 DIAGNOSIS — E78.2 MIXED HYPERLIPIDEMIA: ICD-10-CM

## 2021-01-07 DIAGNOSIS — E11.9 TYPE 2 DIABETES MELLITUS WITHOUT COMPLICATION, WITHOUT LONG-TERM CURRENT USE OF INSULIN (HCC): ICD-10-CM

## 2021-01-07 LAB
ALBUMIN SERPL-MCNC: 4.5 G/DL (ref 3.4–5)
ALBUMIN/GLOB SERPL: 1.6 {RATIO} (ref 0.8–1.7)
ALP SERPL-CCNC: 74 U/L (ref 45–117)
ALT SERPL-CCNC: 43 U/L (ref 16–61)
ANION GAP SERPL CALC-SCNC: 7 MMOL/L (ref 3–18)
AST SERPL-CCNC: 19 U/L (ref 10–38)
BILIRUB SERPL-MCNC: 0.8 MG/DL (ref 0.2–1)
BUN SERPL-MCNC: 30 MG/DL (ref 7–18)
BUN/CREAT SERPL: 26 (ref 12–20)
CALCIUM SERPL-MCNC: 9.6 MG/DL (ref 8.5–10.1)
CHLORIDE SERPL-SCNC: 106 MMOL/L (ref 100–111)
CHOLEST SERPL-MCNC: 131 MG/DL
CO2 SERPL-SCNC: 28 MMOL/L (ref 21–32)
CREAT SERPL-MCNC: 1.15 MG/DL (ref 0.6–1.3)
ERYTHROCYTE [DISTWIDTH] IN BLOOD BY AUTOMATED COUNT: 13.1 % (ref 11.6–14.5)
GLOBULIN SER CALC-MCNC: 2.9 G/DL (ref 2–4)
GLUCOSE SERPL-MCNC: 141 MG/DL (ref 74–99)
HCT VFR BLD AUTO: 51.3 % (ref 36–48)
HDLC SERPL-MCNC: 38 MG/DL (ref 40–60)
HDLC SERPL: 3.4 {RATIO} (ref 0–5)
HGB BLD-MCNC: 17 G/DL (ref 13–16)
LDLC SERPL CALC-MCNC: 77.6 MG/DL (ref 0–100)
LIPID PROFILE,FLP: ABNORMAL
MCH RBC QN AUTO: 29.3 PG (ref 24–34)
MCHC RBC AUTO-ENTMCNC: 33.1 G/DL (ref 31–37)
MCV RBC AUTO: 88.3 FL (ref 74–97)
PLATELET # BLD AUTO: 243 K/UL (ref 135–420)
PMV BLD AUTO: 11.6 FL (ref 9.2–11.8)
POTASSIUM SERPL-SCNC: 4.5 MMOL/L (ref 3.5–5.5)
PROT SERPL-MCNC: 7.4 G/DL (ref 6.4–8.2)
RBC # BLD AUTO: 5.81 M/UL (ref 4.7–5.5)
SODIUM SERPL-SCNC: 141 MMOL/L (ref 136–145)
TRIGL SERPL-MCNC: 77 MG/DL (ref ?–150)
VLDLC SERPL CALC-MCNC: 15.4 MG/DL
WBC # BLD AUTO: 8.1 K/UL (ref 4.6–13.2)

## 2021-01-07 PROCEDURE — 36415 COLL VENOUS BLD VENIPUNCTURE: CPT | Performed by: NURSE PRACTITIONER

## 2021-01-07 PROCEDURE — 80053 COMPREHEN METABOLIC PANEL: CPT

## 2021-01-07 PROCEDURE — 85027 COMPLETE CBC AUTOMATED: CPT

## 2021-01-07 PROCEDURE — 80061 LIPID PANEL: CPT

## 2021-01-07 NOTE — PROGRESS NOTES
Patient presents for lab draw ordered by Rashid Zamora NP  Ordering Department/Practice:  [facility]  Date Ordered:  1-6-2021     The following labs were drawn and sent to [facility]     CBC, Lipid Profile and CMP    The following tubes were sent:    Gold  ( 1) and Lavender  ( 1)    Draw site:  LAC  Pain Level:0  Needle Gauge23  Aseptic technique used  Blood thinners:n  Band-Aid applied  Draw fee added   Procedure tolerated well, patient voiced no complaints.

## 2021-01-27 NOTE — PROGRESS NOTES
Please call patient and inform the following    Labs were reassuring. Cholesterol levels were good however would like your good cholesterol to go up a little bit more your good cholesterol was 38 normal is between 40 and 60. Looking at your labs over the last 2 years your good cholesterol have always been slightly low. Ways to increase your HDL cholesterol is exercise eat fatty fish often such as salmon also weight loss would help. Omega-3 is also a good option. Your complete blood count shows that you are a little anemic but it looks like you have a history of this. I would suggest eating iron rich foods such as red meat seafood dark green leafy vegetables such as spinach would help with increasing iron levels.

## 2021-02-16 NOTE — PROGRESS NOTES
[facility]              [address]        [place], [address]  [phone] fax    Discharge Summary  Patient name: Dank Guevara Start of Care: 2020   Referral source: Charissa Basilio,* : 1985   Medical/Treatment Diagnosis: Pain in right knee [M25.561]  Payor: BLUE CROSS / Plan: [employer] [facility] / Product Type: PPO /  Onset Date:Chronic since ; worse since 10/2020. Prior Hospitalization: see medical history Provider#: [id number]   Medications: Verified on Patient Summary List    Comorbidities: Diabetes, GI Disease, HBP. Prior Level of Function: Independent. Goes to the gym 5 days per week for the past 3 weeks.                    Visits from Start of Care: 4    Missed Visits: 1  Reporting Period : 2020 to 2021    Summary of Care:  1. Patient's pain level will be 0/10-2/10 with activity in order to improve patient's ability to perform normal ADLs. Evaluation:  1/10-8/10. Current:  0/10-4/10.  2020.  Progressing. 2. Patient will demonstrate 0-130 degrees AROM right knee to increase ease of ADLs. Evaluation:  0-125. Current: Met, Right Knee AROM = 0 - 135 degrees, 2020  3. Patient will increase FOTO score to 74 to indicate increased functional mobility. Evaluation: FOTO = 61  4. Patient will report little difficulty with heavy activities at his home in order to increase his functional activity level. Evaluation:  Moderate difficulty. Unable to update goals. Patient did not schedule further appointments. We attempted to contact him on several occasions. After a hold of 30 days he is being discharged.     ASSESSMENT/RECOMMENDATIONS:  [x]Discontinue therapy: []Patient has reached or is progressing toward set goals      [x]Patient is non-compliant or has abdicated      []Due to lack of appreciable progress towards set goals    Nathan Jules PT 2021 8:42 AM

## 2021-03-30 DIAGNOSIS — M22.2X1 PATELLOFEMORAL PAIN SYNDROME OF RIGHT KNEE: ICD-10-CM

## 2021-03-30 RX ORDER — MELOXICAM 15 MG/1
TABLET ORAL
Qty: 30 TAB | Refills: 1 | Status: SHIPPED | OUTPATIENT
Start: 2021-03-30 | End: 2021-06-02

## 2021-04-07 ENCOUNTER — OFFICE VISIT (OUTPATIENT)
Dept: FAMILY MEDICINE CLINIC | Age: 36
End: 2021-04-07
Payer: COMMERCIAL

## 2021-04-07 ENCOUNTER — HOSPITAL ENCOUNTER (OUTPATIENT)
Dept: LAB | Age: 36
Discharge: HOME | End: 2021-04-07
Payer: COMMERCIAL

## 2021-04-07 VITALS
BODY MASS INDEX: 27.78 KG/M2 | OXYGEN SATURATION: 98 % | HEART RATE: 77 BPM | HEIGHT: 67 IN | TEMPERATURE: 98.5 F | RESPIRATION RATE: 24 BRPM | SYSTOLIC BLOOD PRESSURE: 143 MMHG | DIASTOLIC BLOOD PRESSURE: 82 MMHG | WEIGHT: 177 LBS

## 2021-04-07 DIAGNOSIS — Z23 ENCOUNTER FOR IMMUNIZATION: ICD-10-CM

## 2021-04-07 DIAGNOSIS — R53.83 FATIGUE, UNSPECIFIED TYPE: ICD-10-CM

## 2021-04-07 DIAGNOSIS — Z13.29 SCREENING FOR ENDOCRINE, METABOLIC AND IMMUNITY DISORDER: ICD-10-CM

## 2021-04-07 DIAGNOSIS — Z13.228 SCREENING FOR ENDOCRINE, METABOLIC AND IMMUNITY DISORDER: ICD-10-CM

## 2021-04-07 DIAGNOSIS — Z13.0 SCREENING FOR ENDOCRINE, METABOLIC AND IMMUNITY DISORDER: ICD-10-CM

## 2021-04-07 DIAGNOSIS — E11.9 TYPE 2 DIABETES MELLITUS WITHOUT COMPLICATION, WITHOUT LONG-TERM CURRENT USE OF INSULIN (HCC): ICD-10-CM

## 2021-04-07 DIAGNOSIS — G47.00 INSOMNIA, UNSPECIFIED TYPE: Primary | ICD-10-CM

## 2021-04-07 LAB — HBA1C MFR BLD HPLC: 8.2 %

## 2021-04-07 PROCEDURE — 82306 VITAMIN D 25 HYDROXY: CPT

## 2021-04-07 PROCEDURE — 90715 TDAP VACCINE 7 YRS/> IM: CPT | Performed by: NURSE PRACTITIONER

## 2021-04-07 PROCEDURE — 99214 OFFICE O/P EST MOD 30 MIN: CPT | Performed by: NURSE PRACTITIONER

## 2021-04-07 PROCEDURE — 90732 PPSV23 VACC 2 YRS+ SUBQ/IM: CPT | Performed by: NURSE PRACTITIONER

## 2021-04-07 PROCEDURE — 86803 HEPATITIS C AB TEST: CPT

## 2021-04-07 PROCEDURE — 90472 IMMUNIZATION ADMIN EACH ADD: CPT | Performed by: NURSE PRACTITIONER

## 2021-04-07 PROCEDURE — 83036 HEMOGLOBIN GLYCOSYLATED A1C: CPT | Performed by: NURSE PRACTITIONER

## 2021-04-07 PROCEDURE — 90471 IMMUNIZATION ADMIN: CPT | Performed by: NURSE PRACTITIONER

## 2021-04-07 PROCEDURE — 82607 VITAMIN B-12: CPT

## 2021-04-07 PROCEDURE — 84403 ASSAY OF TOTAL TESTOSTERONE: CPT

## 2021-04-07 PROCEDURE — 3052F HG A1C>EQUAL 8.0%<EQUAL 9.0%: CPT | Performed by: NURSE PRACTITIONER

## 2021-04-07 PROCEDURE — 36415 COLL VENOUS BLD VENIPUNCTURE: CPT | Performed by: NURSE PRACTITIONER

## 2021-04-07 PROCEDURE — 84443 ASSAY THYROID STIM HORMONE: CPT

## 2021-04-07 NOTE — PROGRESS NOTES
After obtaining consent, and per orders of Dr. Jefry Dover NP, injection of Tetanus and Pneumovax 23 given by Sherry Heart. Patient instructed to remain in clinic for 20 minutes afterwards, and to report any adverse reaction to me immediately.

## 2021-04-07 NOTE — PROGRESS NOTES
Cely Ca presents today for   Chief Complaint   Patient presents with    Follow Up Chronic Condition       Is someone accompanying this pt? no    Is the patient using any DME equipment during OV? no    Depression Screening:  3 most recent PHQ Screens 4/7/2021   Little interest or pleasure in doing things Not at all   Feeling down, depressed, irritable, or hopeless Not at all   Total Score PHQ 2 0   Trouble falling or staying asleep, or sleeping too much Several days   Feeling tired or having little energy Several days   Poor appetite, weight loss, or overeating Not at all   Feeling bad about yourself - or that you are a failure or have let yourself or your family down Not at all   Trouble concentrating on things such as school, work, reading, or watching TV Not at all   Moving or speaking so slowly that other people could have noticed; or the opposite being so fidgety that others notice Not at all   Thoughts of being better off dead, or hurting yourself in some way Not at all   PHQ 9 Score 2   How difficult have these problems made it for you to do your work, take care of your home and get along with others Not difficult at all       Learning Assessment:  Learning Assessment 2/8/2019   PRIMARY LEARNER Patient   HIGHEST LEVEL OF EDUCATION - PRIMARY LEARNER  [id number] [address] Vivas PRIMARY LEARNER NONE   CO-LEARNER CAREGIVER -   PRIMARY LANGUAGE ENGLISH   LEARNER PREFERENCE PRIMARY DEMONSTRATION   ANSWERED BY patient   RELATIONSHIP SELF       Abuse Screening:  Abuse Screening Questionnaire 1/11/2019   Do you ever feel afraid of your partner? N   Are you in a relationship with someone who physically or mentally threatens you? N   Is it safe for you to go home? Y       Fall Risk  No flowsheet data found. Health Maintenance reviewed and discussed and ordered per Provider.     Health Maintenance Due   Topic Date Due    Hepatitis C Screening  Never done    Pneumococcal 0-64 years (1 of 1 - PPSV23) Never done    COVID-19 Vaccine (1) Never done    DTaP/Tdap/Td series (1 - Tdap) Never done    Eye Exam Retinal or Dilated  02/27/2021   . Coordination of Care:  1. Have you been to the ER, urgent care clinic since your last visit? Hospitalized since your last visit? no    2. Have you seen or consulted any other health care providers outside of the [address] [place] [address] since your last visit? Include any pap smears or colon screening.  no      Last  Checked no  Last UDS Checked no  Last Pain contract signed: no

## 2021-04-07 NOTE — PROGRESS NOTES
Health maintenance issues addressed patient was reminded he is in need of eye exam.  Denies vaccines at this time

## 2021-04-07 NOTE — PROGRESS NOTES
Patient presents for lab draw ordered by Dr Deepti Prince, NP  Ordering Department/Practice:  [facility]  Date Ordered:  4-7-2021     The following labs were drawn and sent to [facility]     TSH, 3rd Generation and B12, Vitamin D Hydroxy, Hepatitis C AB, Testerone Free Total    The following tubes were sent:    Gold  ( 3)    Draw site:  lac  Pain Level:0  Needle Gauge21  Aseptic technique used  Blood thinners:n  Band-Aid applied  Draw fee added   Procedure tolerated well, patient voiced no complaints.

## 2021-04-07 NOTE — PROGRESS NOTES
OFFICE NOTE    Gunner Painter is a 38 y.o. male presenting today for the following:  Chief Complaint   Patient presents with    Follow Up Chronic Condition      HPI     Diabetes  Most recent A1C was 7.6. Today his A1C has increased to 8.2. Patient is currently on Jardiance 10 mg daily and  glyburide-metformin 5-500 mg daily. He glucose at home have been averaging 180. He states he have not exercised as normally and have wavered off his healthy eating habits lately due to fatigue. Patient denies polyphagia, polydipsia, polyuria. Educated patient on A1C and we will recheck in 3 months. Informed patient if he continues to increase we will have to adjust his medications. He stated he have got it down in the past and he will work on it to decrease prior to next A1C. Patient denies side effects from medication and take it as prescribed. Fatigue  Patient states he have been extremely tired lately. He states he can sleep from 3 hours up to 12 hours and still feel tired and fatigue. He use caffeine to keep himself going with work during the day. Have a history of Vitamin D deficiency. Patient is requesting labs to determine if he is deficient. Patient states he can drink coffee and still go straight to sleep. Elevated cholesterol  He is taking rosuvastatin 10 mg nightly. He denies any side effects from this medication and take as prescribed. HM  Pneumococcal - will get done today  COVID - will be getting them through work. Tdap - will get done today  Eye exam - will schedule eye exam.          Review of Systems   Constitutional: Positive for fatigue. Negative for fever. HENT: Negative. Eyes: Negative. Respiratory: Negative for cough, chest tightness, shortness of breath and wheezing. Cardiovascular: Negative for chest pain and palpitations. Neurological: Negative for dizziness, light-headedness and headaches.          History  Past Medical History:   Diagnosis Date    Diabetes ([facility] [address] 01/2019    Hyperlipidemia 01/2019    Hypertension     intermittently for a few years    Irritable bowel syndrome        Past Surgical History:   Procedure Laterality Date    HX ORTHOPAEDIC      hammer toe surgery    HX WISDOM TEETH EXTRACTION         Social History     Socioeconomic History    Marital status:      Spouse name: Not on file    Number of children: Not on file    Years of education: Not on file    Highest education level: Not on file   Occupational History    Not on file   Social Needs    Financial resource strain: Not on file    Food insecurity     Worry: Not on file     Inability: Not on file    Transportation needs     Medical: Not on file     Non-medical: Not on file   Tobacco Use    Smoking status: Current Some Day Smoker     Types: Cigars    Smokeless tobacco: Current User     Types: Chew    Tobacco comment: maybe once per week    Substance and Sexual Activity    Alcohol use: Yes     Comment: occasional    Drug use: No    Sexual activity: Not on file   Lifestyle    Physical activity     Days per week: Not on file     Minutes per session: Not on file    Stress: Not on file   Relationships    Social connections     Talks on phone: Not on file     Gets together: Not on file     Attends Oriental orthodox service: Not on file     Active member of club or organization: Not on file     Attends meetings of clubs or organizations: Not on file     Relationship status: Not on file    Intimate partner violence     Fear of current or ex partner: Not on file     Emotionally abused: Not on file     Physically abused: Not on file     Forced sexual activity: Not on file   Other Topics Concern    Not on file   Social History Narrative    Not on file       No Known Allergies    Current Outpatient Medications   Medication Sig Dispense Refill    meloxicam (MOBIC) 15 mg tablet TAKE 1 TABLET BY MOUTH ONCE DAILY AS NEEDED 30 Tab 1    empagliflozin (Jardiance) 10 mg tablet TAKE 1 TABLET BY MOUTH EVERY DAY 90 Tab 0    glyBURIDE-metFORMIN (GLUCOVANCE) 5-500 mg per tablet TAKE 1 TABLET BY MOUTH TWICE A DAY WITH MEALS 180 Tab 0    rosuvastatin (CRESTOR) 10 mg tablet Take 1 Tab by mouth nightly. 90 Tab 3    glucose blood VI test strips (ASCENSIA AUTODISC VI, ONE TOUCH ULTRA TEST VI) strip Check blood sugar three times a day after each meal *ONE TOUCH ULTRA BLUE* 100 Strip 5    Blood-Glucose Meter monitoring kit Check blood sugar three times a day after meals 1 Kit 0    lancets misc Check blood sugar three times a day after each meal 100 Each 0    aspirin delayed-release 81 mg tablet Take  by mouth daily. Patient Care Team:  Patient Care Team:  Denzel Marquez NP as PCP - General (Nurse Practitioner)  Denzel Marquez NP as PCP - [facility]      LABS:  Results for orders placed or performed in visit on 04/07/21   AMB POC HEMOGLOBIN A1C   Result Value Ref Range    Hemoglobin A1c (POC) 8.2 %        RADIOLOGY:  No recent results      Physical Exam  Vitals signs and nursing note reviewed. Constitutional:       Appearance: He is well-developed. Neck:      Musculoskeletal: Normal range of motion and neck supple. Cardiovascular:      Rate and Rhythm: Normal rate and regular rhythm. Heart sounds: Normal heart sounds. Pulmonary:      Effort: Pulmonary effort is normal. No respiratory distress. Breath sounds: Normal breath sounds. No wheezing or rales. Musculoskeletal: Normal range of motion. Lymphadenopathy:      Cervical: No cervical adenopathy. Skin:     General: Skin is warm and dry. Neurological:      Mental Status: He is alert and oriented to person, place, and time. Deep Tendon Reflexes: Reflexes are normal and symmetric.    Psychiatric:         Mood and Affect: Mood normal.           Vitals:    04/07/21 1332 04/07/21 1337   BP: (!) 140/86 (!) 143/82   Pulse: 77    Resp: 24    Temp: 98.5 °F (36.9 °C)    TempSrc: Temporal SpO2: 98%    Weight: 177 lb (80.3 kg)    Height: 5' 7\" (1.702 m)    PainSc:   0 - No pain          Assessment and Plan    1. Type 2 diabetes mellitus without complication, without long-term current use of insulin (HCC)    - AMB POC HEMOGLOBIN A1C  - TSH 3RD GENERATION; Future    2. Mixed hyperlipidemia  Will continue on same regimen    3. Fatigue, unspecified type    - VITAMIN D, 25 HYDROXY; Future  - TSH 3RD GENERATION; Future  - VITAMIN B12; Future  - TESTOSTERONE, FREE & TOTAL; Future    4. Encounter for immunization    - PNEUMOCOCCAL POLYSACCHARIDE VACCINE, 23-VALENT, ADULT OR IMMUNOSUPPRESSED PT DOSE,  - TETANUS, DIPHTHERIA TOXOIDS AND ACELLULAR PERTUSSIS VACCINE (TDAP), IN INDIVIDS. >=7, IM    5. Screening for endocrine, metabolic and immunity disorder    - HEPATITIS C AB; Future  - TSH 3RD GENERATION; Future  - TESTOSTERONE, FREE & TOTAL; Future      MDM    Procedures      *Plan of care reviewed with patient. Patient in agreement with plan and expresses understanding. All questions answered and patient encouraged to call or RTO if further questions or concerns. Advised patient if symptoms worsen to go to nearest ER or call 911. AVS and recommendations given to patient upon discharge.

## 2021-04-08 LAB
25(OH)D3 SERPL-MCNC: 29 NG/ML (ref 30–100)
TSH SERPL DL<=0.05 MIU/L-ACNC: 1.22 UIU/ML (ref 0.36–3.74)
VIT B12 SERPL-MCNC: 865 PG/ML (ref 211–911)

## 2021-04-09 LAB
HCV AB SER IA-ACNC: 0.04 INDEX
HCV AB SERPL QL IA: NEGATIVE
HCV COMMENT,HCGAC: NORMAL

## 2021-04-11 LAB
COMMENT, TESC2: NORMAL
TESTOST FREE SERPL-MCNC: 9.7 PG/ML (ref 8.7–25.1)
TESTOST SERPL-MCNC: 367 NG/DL (ref 264–916)

## 2021-04-20 DIAGNOSIS — E55.9 VITAMIN D DEFICIENCY: Primary | ICD-10-CM

## 2021-04-20 DIAGNOSIS — R53.83 FATIGUE, UNSPECIFIED TYPE: ICD-10-CM

## 2021-04-20 RX ORDER — ASPIRIN 325 MG
50000 TABLET, DELAYED RELEASE (ENTERIC COATED) ORAL
Qty: 12 CAP | Refills: 0 | Status: SHIPPED | OUTPATIENT
Start: 2021-04-20 | End: 2021-07-07 | Stop reason: SDUPTHER

## 2021-04-21 NOTE — PROGRESS NOTES
Call patient with the following results    Please inform patient that the vitamin D level was slightly low at 29 and normal vitamin D level should be above 30. Also the vitamin B12 was normal, the thyroid was normal and the testosterone that we checked was normal.  Inform patient that I am going to prescribe vitamin D for him to take for total of 12 weeks since he have a history and have his symptoms of fatigue.

## 2021-04-27 NOTE — PROGRESS NOTES
Please inform the patient of the following    Is inform patient that the lab work was reassuring however his vitamin D is just slightly low at 29.0 normal should be 30 and up. But it looks like patient already is on vitamin D.   Inform patient that his testosterone, thyroid level, B12 was on normal.

## 2021-04-28 NOTE — PROGRESS NOTES
Left message that all labs were normal with the exception of vitamin D in which the patient is already taking-encouraged them to continue taking it

## 2021-04-30 DIAGNOSIS — E11.9 TYPE 2 DIABETES MELLITUS WITHOUT COMPLICATION, WITHOUT LONG-TERM CURRENT USE OF INSULIN (HCC): ICD-10-CM

## 2021-04-30 RX ORDER — GLYBURIDE-METFORMIN HYDROCHLORIDE 5; 500 MG/1; MG/1
TABLET ORAL
Qty: 180 TAB | Refills: 0 | Status: SHIPPED | OUTPATIENT
Start: 2021-04-30 | End: 2021-08-17

## 2021-06-02 DIAGNOSIS — M22.2X1 PATELLOFEMORAL PAIN SYNDROME OF RIGHT KNEE: ICD-10-CM

## 2021-06-02 RX ORDER — MELOXICAM 15 MG/1
TABLET ORAL
Qty: 30 TABLET | Refills: 1 | Status: SHIPPED | OUTPATIENT
Start: 2021-06-02 | End: 2021-08-03

## 2021-07-07 ENCOUNTER — OFFICE VISIT (OUTPATIENT)
Dept: FAMILY MEDICINE CLINIC | Age: 36
End: 2021-07-07
Payer: COMMERCIAL

## 2021-07-07 VITALS
HEART RATE: 72 BPM | OXYGEN SATURATION: 98 % | RESPIRATION RATE: 16 BRPM | TEMPERATURE: 97.9 F | WEIGHT: 175 LBS | DIASTOLIC BLOOD PRESSURE: 88 MMHG | SYSTOLIC BLOOD PRESSURE: 131 MMHG | BODY MASS INDEX: 27.47 KG/M2 | HEIGHT: 67 IN

## 2021-07-07 DIAGNOSIS — E11.9 TYPE 2 DIABETES MELLITUS WITHOUT COMPLICATION, WITHOUT LONG-TERM CURRENT USE OF INSULIN (HCC): ICD-10-CM

## 2021-07-07 DIAGNOSIS — Z13.0 SCREENING FOR ENDOCRINE, METABOLIC AND IMMUNITY DISORDER: ICD-10-CM

## 2021-07-07 DIAGNOSIS — Z13.6 SCREENING FOR CARDIOVASCULAR CONDITION: ICD-10-CM

## 2021-07-07 DIAGNOSIS — E78.2 MIXED HYPERLIPIDEMIA: ICD-10-CM

## 2021-07-07 DIAGNOSIS — Z13.29 SCREENING FOR ENDOCRINE, METABOLIC AND IMMUNITY DISORDER: ICD-10-CM

## 2021-07-07 DIAGNOSIS — E11.9 ENCOUNTER FOR DIABETIC FOOT EXAM (HCC): ICD-10-CM

## 2021-07-07 DIAGNOSIS — Z13.228 SCREENING FOR ENDOCRINE, METABOLIC AND IMMUNITY DISORDER: ICD-10-CM

## 2021-07-07 DIAGNOSIS — E55.9 VITAMIN D DEFICIENCY: ICD-10-CM

## 2021-07-07 DIAGNOSIS — R53.83 FATIGUE, UNSPECIFIED TYPE: ICD-10-CM

## 2021-07-07 DIAGNOSIS — I10 ESSENTIAL HYPERTENSION: ICD-10-CM

## 2021-07-07 LAB — HBA1C MFR BLD HPLC: 7.6 %

## 2021-07-07 PROCEDURE — 3051F HG A1C>EQUAL 7.0%<8.0%: CPT | Performed by: NURSE PRACTITIONER

## 2021-07-07 PROCEDURE — 99214 OFFICE O/P EST MOD 30 MIN: CPT | Performed by: NURSE PRACTITIONER

## 2021-07-07 PROCEDURE — 83036 HEMOGLOBIN GLYCOSYLATED A1C: CPT | Performed by: NURSE PRACTITIONER

## 2021-07-07 RX ORDER — ASPIRIN 325 MG
50000 TABLET, DELAYED RELEASE (ENTERIC COATED) ORAL
Qty: 12 CAPSULE | Refills: 0 | Status: SHIPPED | OUTPATIENT
Start: 2021-07-07 | End: 2021-11-22

## 2021-07-07 NOTE — PROGRESS NOTES
Health maintneance issues addressed, patient has had both covid shots but no card available to update chart. Patient was reminded that routine eye exam is due and asked to have records sent to this office. Foot exam to be done in office at today's appointment  Adalberto Manley presents today for   Chief Complaint   Patient presents with    Follow Up Chronic Condition       Is someone accompanying this pt? no    Is the patient using any DME equipment during OV? no    Depression Screening:  3 most recent PHQ Screens 7/7/2021   Little interest or pleasure in doing things Not at all   Feeling down, depressed, irritable, or hopeless Not at all   Total Score PHQ 2 0   Trouble falling or staying asleep, or sleeping too much Not at all   Feeling tired or having little energy Not at all   Poor appetite, weight loss, or overeating Not at all   Feeling bad about yourself - or that you are a failure or have let yourself or your family down Not at all   Trouble concentrating on things such as school, work, reading, or watching TV Not at all   Moving or speaking so slowly that other people could have noticed; or the opposite being so fidgety that others notice Not at all   Thoughts of being better off dead, or hurting yourself in some way Not at all   PHQ 9 Score 0   How difficult have these problems made it for you to do your work, take care of your home and get along with others Not difficult at all       Learning Assessment:  Learning Assessment 2/8/2019   PRIMARY LEARNER Patient   HIGHEST LEVEL OF EDUCATION - PRIMARY LEARNER  [id number] [address]iel Vivas PRIMARY LEARNER NONE   CO-LEARNER CAREGIVER -   PRIMARY LANGUAGE ENGLISH   LEARNER PREFERENCE PRIMARY DEMONSTRATION   ANSWERED BY patient   RELATIONSHIP SELF       Abuse Screening:  Abuse Screening Questionnaire 1/11/2019   Do you ever feel afraid of your partner? N   Are you in a relationship with someone who physically or mentally threatens you?  N   Is it safe for you to go home? Y       Fall Risk  No flowsheet data found. Health Maintenance reviewed and discussed and ordered per Provider. Health Maintenance Due   Topic Date Due    COVID-19 Vaccine (1) Never done    Eye Exam Retinal or Dilated  02/27/2021    Foot Exam Q1  07/07/2021   . Coordination of Care:  1. Have you been to the ER, urgent care clinic since your last visit? Hospitalized since your last visit? no    2. Have you seen or consulted any other health care providers outside of the [address] [place] [address] since your last visit? Include any pap smears or colon screening.  no      Last  Checked no  Last UDS Checked no  Last Pain contract signed: no

## 2021-07-07 NOTE — PROGRESS NOTES
OFFICE NOTE    Jeremiah Maynard is a 38 y.o. male presenting today for the following:  Chief Complaint   Patient presents with    Follow Up Chronic Condition      HPI      Diabetes/Choelsterol  Most recent A1C was 8.2 in April 2021. Previous visit patient A1C was 8.2. Todays A1C is  Patient is currently on Jardiance 10 mg daily and  glyburide-metformin 5-500 mg daily. Patient A1C today is 7.6. Patient denies polyphagia, polyuria, polydipsia. Will continue on current regimen. Patient is currently on rosuvastatin 10 mg. Patient denies side effects and take as prescribed. Vitamin D def  Patient currently on vitamin D 50,000 units. Patient states the vitamin D has helped but by the end of the week he is tired. He sleeps at least 8 hours. Will continue on vitamin D and check Vitamin B12. Diabetic Foot Exam: DP pulses 2+ symmetric, no open areas or skin breakdown noted, sensory intact to filament and positioning. Vibratory sensation not tested. Nails in good condition. HM  Foot Exam - Done today  COVID 19 vaccine - had completed will bring card on next visit. Eye exam - Will get done    Review of Systems   Constitutional: Negative for fatigue and fever. HENT: Negative. Eyes: Negative. Respiratory: Negative for cough, chest tightness, shortness of breath and wheezing. Cardiovascular: Negative for chest pain and palpitations. Neurological: Negative for dizziness, light-headedness and headaches.          History  Past Medical History:   Diagnosis Date    Diabetes ([facility] [address] 01/2019    Hyperlipidemia 01/2019    Hypertension     intermittently for a few years    Irritable bowel syndrome        Past Surgical History:   Procedure Laterality Date    HX ORTHOPAEDIC      hammer toe surgery    HX WISDOM TEETH EXTRACTION         Social History     Socioeconomic History    Marital status:      Spouse name: Not on file    Number of children: Not on file    Years of education: Not on file    Highest education level: Not on file   Occupational History    Not on file   Tobacco Use    Smoking status: Current Some Day Smoker     Types: Cigars    Smokeless tobacco: Current User     Types: Chew    Tobacco comment: maybe once per week    Substance and Sexual Activity    Alcohol use: Yes     Comment: occasional    Drug use: No    Sexual activity: Not on file   Other Topics Concern    Not on file   Social History Narrative    Not on file     Social Determinants of Health     Financial Resource Strain:     Difficulty of Paying Living Expenses:    Food Insecurity:     Worried About Running Out of Food in the Last Year:     Ran Out of Food in the Last Year:    Transportation Needs:     Lack of Transportation (Medical):  Lack of Transportation (Non-Medical):    Physical Activity:     Days of Exercise per Week:     Minutes of Exercise per Session:    Stress:     Feeling of Stress :    Social Connections:     Frequency of Communication with Friends and Family:     Frequency of Social Gatherings with Friends and Family:     Attends Voodoo Services:     Active Member of Clubs or Organizations:     Attends Club or Organization Meetings:     Marital Status:    Intimate Partner Violence:     Fear of Current or Ex-Partner:     Emotionally Abused:     Physically Abused:     Sexually Abused:        No Known Allergies    Current Outpatient Medications   Medication Sig Dispense Refill    cholecalciferol (VITAMIN D3) (50,000 UNITS /1250 MCG) capsule Take 1 Capsule by mouth every seven (7) days for 12 doses. Then get an OTC version of 1,000-2,000 units to take daily.  12 Capsule 0    meloxicam (MOBIC) 15 mg tablet TAKE 1 TABLET BY MOUTH ONCE DAILY AS NEEDED 30 Tablet 1    glyBURIDE-metFORMIN (GLUCOVANCE) 5-500 mg per tablet TAKE 1 TABLET BY MOUTH TWICE A DAY WITH MEALS 180 Tab 0    empagliflozin (Jardiance) 10 mg tablet TAKE 1 TABLET BY MOUTH EVERY DAY 90 Tab 0    rosuvastatin (CRESTOR) 10 mg tablet Take 1 Tab by mouth nightly. 90 Tab 3    glucose blood VI test strips (ASCENSIA AUTODISC VI, ONE TOUCH ULTRA TEST VI) strip Check blood sugar three times a day after each meal *ONE TOUCH ULTRA BLUE* 100 Strip 5    Blood-Glucose Meter monitoring kit Check blood sugar three times a day after meals 1 Kit 0    lancets misc Check blood sugar three times a day after each meal 100 Each 0    aspirin delayed-release 81 mg tablet Take  by mouth daily. Patient Care Team:  Patient Care Team:  Teagan Denton NP as PCP - General (Nurse Practitioner)  Teagan Denton NP as PCP - [facility] Provider      LABS:  No results found for any visits on 07/07/21. RADIOLOGY:  No recent results      Physical Exam  Vitals and nursing note reviewed. Constitutional:       Appearance: Normal appearance. He is well-developed. Cardiovascular:      Rate and Rhythm: Normal rate and regular rhythm. Heart sounds: Normal heart sounds. Pulmonary:      Effort: Pulmonary effort is normal. No respiratory distress. Breath sounds: Normal breath sounds. No wheezing or rales. Abdominal:      General: Bowel sounds are normal. There is no distension. Palpations: Abdomen is soft. Tenderness: There is no abdominal tenderness. There is no rebound. Musculoskeletal:         General: Normal range of motion. Cervical back: Normal range of motion and neck supple. Lymphadenopathy:      Cervical: No cervical adenopathy. Skin:     General: Skin is warm and dry. Neurological:      Mental Status: He is alert and oriented to person, place, and time. Deep Tendon Reflexes: Reflexes are normal and symmetric.    Psychiatric:         Mood and Affect: Mood normal.           Vitals:    07/07/21 1401   BP: 131/88   Pulse: 72   Resp: 16   Temp: 97.9 °F (36.6 °C)   TempSrc: Oral   SpO2: 98%   Weight: 175 lb (79.4 kg)   Height: 5' 7\" (1.702 m)   PainSc:   0 - No pain Assessment and Plan    1. Type 2 diabetes mellitus without complication, without long-term current use of insulin (HCC)    - AMB POC HEMOGLOBIN A1C    2. Mixed hyperlipidemia    - LIPID PANEL; Future    3. Essential hypertension    - CBC W/O DIFF; Future  - METABOLIC PANEL, COMPREHENSIVE; Future    4. Vitamin D deficiency    - cholecalciferol (VITAMIN D3) (50,000 UNITS /1250 MCG) capsule; Take 1 Capsule by mouth every seven (7) days for 12 doses. Then get an OTC version of 1,000-2,000 units to take daily. Dispense: 12 Capsule; Refill: 0    5. Fatigue, unspecified type    - cholecalciferol (VITAMIN D3) (50,000 UNITS /1250 MCG) capsule; Take 1 Capsule by mouth every seven (7) days for 12 doses. Then get an OTC version of 1,000-2,000 units to take daily. Dispense: 12 Capsule; Refill: 0  - VITAMIN D, 25 HYDROXY; Future  - VITAMIN B12; Future    6. Screening for endocrine, metabolic and immunity disorder    - CBC W/O DIFF; Future  - METABOLIC PANEL, COMPREHENSIVE; Future  - LIPID PANEL; Future  - AMB POC HEMOGLOBIN A1C    7. Screening for cardiovascular condition    - LIPID PANEL; Future    8. Encounter for diabetic foot exam ([facility] [address]    -  DIABETES FOOT EXAM      MDM    Procedures      *Plan of care reviewed with patient. Patient in agreement with plan and expresses understanding. All questions answered and patient encouraged to call or RTO if further questions or concerns. Advised patient if symptoms worsen to go to nearest ER or call 911. AVS and recommendations given to patient upon discharge.

## 2021-07-19 DIAGNOSIS — E78.2 MIXED HYPERLIPIDEMIA: ICD-10-CM

## 2021-07-19 NOTE — TELEPHONE ENCOUNTER
Requested Prescriptions     Pending Prescriptions Disp Refills    rosuvastatin (CRESTOR) 10 mg tablet 90 Tablet 3     Sig: Take 1 Tablet by mouth nightly.

## 2021-07-20 RX ORDER — ROSUVASTATIN CALCIUM 10 MG/1
10 TABLET, COATED ORAL
Qty: 90 TABLET | Refills: 3 | Status: SHIPPED | OUTPATIENT
Start: 2021-07-20

## 2021-07-20 NOTE — TELEPHONE ENCOUNTER
FYI-    Patient should have refills available, but per pharmacist this prescription for some reason went in active on 4-.   Verbal order given for #90 x 1 (to equal what was inactive)  Provider notified  (No action needed)

## 2021-08-02 DIAGNOSIS — M22.2X1 PATELLOFEMORAL PAIN SYNDROME OF RIGHT KNEE: ICD-10-CM

## 2021-08-03 RX ORDER — MELOXICAM 15 MG/1
TABLET ORAL
Qty: 30 TABLET | Refills: 1 | Status: SHIPPED | OUTPATIENT
Start: 2021-08-03 | End: 2021-10-29

## 2021-08-16 DIAGNOSIS — E11.9 TYPE 2 DIABETES MELLITUS WITHOUT COMPLICATION, WITHOUT LONG-TERM CURRENT USE OF INSULIN (HCC): ICD-10-CM

## 2021-08-17 RX ORDER — GLYBURIDE-METFORMIN HYDROCHLORIDE 5; 500 MG/1; MG/1
TABLET ORAL
Qty: 180 TABLET | Refills: 0 | Status: SHIPPED | OUTPATIENT
Start: 2021-08-17 | End: 2021-11-24

## 2021-10-29 DIAGNOSIS — M22.2X1 PATELLOFEMORAL PAIN SYNDROME OF RIGHT KNEE: ICD-10-CM

## 2021-10-29 RX ORDER — MELOXICAM 15 MG/1
TABLET ORAL
Qty: 30 TABLET | Refills: 1 | Status: SHIPPED | OUTPATIENT
Start: 2021-10-29 | End: 2022-01-03

## 2021-11-19 DIAGNOSIS — E55.9 VITAMIN D DEFICIENCY: ICD-10-CM

## 2021-11-19 DIAGNOSIS — R53.83 FATIGUE, UNSPECIFIED TYPE: ICD-10-CM

## 2021-11-22 RX ORDER — ASPIRIN 325 MG
TABLET, DELAYED RELEASE (ENTERIC COATED) ORAL
Qty: 12 CAPSULE | Refills: 0 | Status: SHIPPED | OUTPATIENT
Start: 2021-11-22 | End: 2022-02-19 | Stop reason: ALTCHOICE

## 2021-11-24 DIAGNOSIS — E11.9 TYPE 2 DIABETES MELLITUS WITHOUT COMPLICATION, WITHOUT LONG-TERM CURRENT USE OF INSULIN (HCC): ICD-10-CM

## 2021-11-24 RX ORDER — GLYBURIDE-METFORMIN HYDROCHLORIDE 5; 500 MG/1; MG/1
TABLET ORAL
Qty: 180 TABLET | Refills: 0 | Status: SHIPPED | OUTPATIENT
Start: 2021-11-24 | End: 2022-02-19

## 2022-01-03 DIAGNOSIS — M22.2X1 PATELLOFEMORAL PAIN SYNDROME OF RIGHT KNEE: ICD-10-CM

## 2022-01-03 RX ORDER — MELOXICAM 15 MG/1
TABLET ORAL
Qty: 30 TABLET | Refills: 1 | Status: SHIPPED | OUTPATIENT
Start: 2022-01-03

## 2022-01-26 ENCOUNTER — OFFICE VISIT (OUTPATIENT)
Dept: FAMILY MEDICINE CLINIC | Age: 37
End: 2022-01-26
Payer: COMMERCIAL

## 2022-01-26 VITALS
HEIGHT: 67 IN | BODY MASS INDEX: 27.47 KG/M2 | OXYGEN SATURATION: 98 % | TEMPERATURE: 98.3 F | WEIGHT: 175 LBS | RESPIRATION RATE: 20 BRPM | SYSTOLIC BLOOD PRESSURE: 143 MMHG | DIASTOLIC BLOOD PRESSURE: 98 MMHG | HEART RATE: 82 BPM

## 2022-01-26 DIAGNOSIS — R03.0 ELEVATED BLOOD PRESSURE READING WITHOUT DIAGNOSIS OF HYPERTENSION: ICD-10-CM

## 2022-01-26 DIAGNOSIS — E11.9 TYPE 2 DIABETES MELLITUS WITHOUT COMPLICATION, WITHOUT LONG-TERM CURRENT USE OF INSULIN (HCC): ICD-10-CM

## 2022-01-26 DIAGNOSIS — Z13.29 SCREENING FOR ENDOCRINE, METABOLIC AND IMMUNITY DISORDER: ICD-10-CM

## 2022-01-26 DIAGNOSIS — Z13.0 SCREENING FOR ENDOCRINE, METABOLIC AND IMMUNITY DISORDER: ICD-10-CM

## 2022-01-26 DIAGNOSIS — Z13.228 SCREENING FOR ENDOCRINE, METABOLIC AND IMMUNITY DISORDER: ICD-10-CM

## 2022-01-26 DIAGNOSIS — E55.9 VITAMIN D DEFICIENCY: ICD-10-CM

## 2022-01-26 DIAGNOSIS — E78.2 MIXED HYPERLIPIDEMIA: ICD-10-CM

## 2022-01-26 PROCEDURE — 99214 OFFICE O/P EST MOD 30 MIN: CPT | Performed by: NURSE PRACTITIONER

## 2022-01-26 NOTE — PROGRESS NOTES
1. \"Have you been to the ER, urgent care clinic since your last visit? Hospitalized since your last visit? \" No    2. \"Have you seen or consulted any other health care providers outside of the [address] [place] [address] since your last visit? \" No     3. For patients aged 39-70: Has the patient had a colonoscopy / FIT/ Cologuard? NA - based on age      If the patient is female:    4. For patients aged 41-77: Has the patient had a mammogram within the past 2 years? NA - based on age or sex  See top three    5. For patients aged 21-65: Has the patient had a pap smear?  NA - based on age or sex

## 2022-01-26 NOTE — PROGRESS NOTES
OFFICE NOTE    Sophia Maharaj is a 43 y.o. male presenting today for the following:  Chief Complaint   Patient presents with    Follow Up Chronic Condition      HPI     Diabetes/Choelsterol  Patient is currently on Jardiance 10 mg daily and  glyburide-metformin 5-500 mg daily. Patient Last A1C is 7.6. Patient glucose at home run 150's. He states he have not been managing his diet or exercise during the holidays. Patient denies polyphagia, polyuria, polydipsia. Will continue on current regimen. Patient is currently on rosuvastatin 10 mg. Patient denies side effects and take as prescribed. Elevated blood pressure  Patient have high blood pressure without ever being diagnosed. Patient states at home his blood pressure run 130's over 70's at home. Denies chest pain, SOB, vision changes or headaches. Advised patient to monitor and if continues above 140/90 to follow up in the office. Review of Systems   Constitutional: Negative for fatigue and fever. HENT: Negative. Eyes: Negative. Respiratory: Negative for cough, chest tightness, shortness of breath and wheezing. Cardiovascular: Negative for chest pain and palpitations. Endocrine: Negative. Neurological: Negative for dizziness, light-headedness and headaches.      History  Past Medical History:   Diagnosis Date    Diabetes ([facility] [address] 01/2019    Hyperlipidemia 01/2019    Hypertension     intermittently for a few years    Irritable bowel syndrome        Past Surgical History:   Procedure Laterality Date    HX ORTHOPAEDIC      hammer toe surgery    HX WISDOM TEETH EXTRACTION         Social History     Socioeconomic History    Marital status:      Spouse name: Not on file    Number of children: Not on file    Years of education: Not on file    Highest education level: Not on file   Occupational History    Not on file   Tobacco Use    Smoking status: Current Some Day Smoker     Types: Cigars    Smokeless tobacco: Current User Types: Chew    Tobacco comment: maybe once per week    Vaping Use    Vaping Use: Never used   Substance and Sexual Activity    Alcohol use: Yes     Comment: occasional    Drug use: No    Sexual activity: Not on file   Other Topics Concern    Not on file   Social History Narrative    Not on file     Social Determinants of Health     Financial Resource Strain:     Difficulty of Paying Living Expenses: Not on file   Food Insecurity:     Worried About Running Out of Food in the Last Year: Not on file    Leilani of Food in the Last Year: Not on file   Transportation Needs:     Lack of Transportation (Medical): Not on file    Lack of Transportation (Non-Medical):  Not on file   Physical Activity:     Days of Exercise per Week: Not on file    Minutes of Exercise per Session: Not on file   Stress:     Feeling of Stress : Not on file   Social Connections:     Frequency of Communication with Friends and Family: Not on file    Frequency of Social Gatherings with Friends and Family: Not on file    Attends Yarsani Services: Not on file    Active Member of [address] [place] [address] or Organizations: Not on file    Attends Club or Organization Meetings: Not on file    Marital Status: Not on file   Intimate Partner Violence:     Fear of Current or Ex-Partner: Not on file    Emotionally Abused: Not on file    Physically Abused: Not on file    Sexually Abused: Not on file   Housing Stability:     Unable to Pay for Housing in the Last Year: Not on file    Number of [place] in the Last Year: Not on file    Unstable Housing in the Last Year: Not on file       No Known Allergies    Current Outpatient Medications   Medication Sig Dispense Refill    meloxicam (MOBIC) 15 mg tablet TAKE 1 TABLET BY MOUTH ONCE DAILY AS NEEDED 30 Tablet 1    glyBURIDE-metFORMIN (GLUCOVANCE) 5-500 mg per tablet TAKE 1 TABLET BY MOUTH TWICE A DAY WITH MEALS 180 Tablet 0    empagliflozin (Jardiance) 10 mg tablet TAKE 1 TABLET BY MOUTH EVERY DAY 90 Tablet 0    cholecalciferol (VITAMIN D3) (50,000 UNITS /1250 MCG) capsule TAKE 1 CAPSULE BY MOUTH EVERY 7 DAYS FOR 12 DOSES, THEN GET AN OTC OF 1,000-2000 UNITS TO TAKE DAILY 12 Capsule 0    rosuvastatin (CRESTOR) 10 mg tablet Take 1 Tablet by mouth nightly. 90 Tablet 3    glucose blood VI test strips (ASCENSIA AUTODISC VI, ONE TOUCH ULTRA TEST VI) strip Check blood sugar three times a day after each meal *ONE TOUCH ULTRA BLUE* 100 Strip 5    Blood-Glucose Meter monitoring kit Check blood sugar three times a day after meals 1 Kit 0    lancets misc Check blood sugar three times a day after each meal 100 Each 0    aspirin delayed-release 81 mg tablet Take  by mouth daily. Patient Care Team:  Patient Care Team:  Radha Francisco NP as PCP - General (Nurse Practitioner)  Radha Francisco NP as PCP - [facility] Provider      LABS:  No results found for any visits on 01/26/22. RADIOLOGY:  No recent results      Physical Exam  Vitals and nursing note reviewed. Constitutional:       Appearance: Normal appearance. He is well-developed. HENT:      Left Ear: External ear normal.   Eyes:      Pupils: Pupils are equal, round, and reactive to light. Cardiovascular:      Rate and Rhythm: Normal rate and regular rhythm. Heart sounds: Normal heart sounds. Pulmonary:      Effort: Pulmonary effort is normal. No respiratory distress. Breath sounds: Normal breath sounds. No wheezing or rales. Abdominal:      General: Bowel sounds are normal. There is no distension. Palpations: Abdomen is soft. Tenderness: There is no abdominal tenderness. There is no rebound. Musculoskeletal:         General: Normal range of motion. Cervical back: Normal range of motion and neck supple. Lymphadenopathy:      Cervical: No cervical adenopathy. Skin:     General: Skin is warm and dry. Neurological:      Mental Status: He is alert and oriented to person, place, and time. Deep Tendon Reflexes: Reflexes are normal and symmetric. Vitals:    01/26/22 1613 01/26/22 1620   BP: (!) 132/90 (!) 143/98   Pulse: 82    Resp: 20    Temp: 98.3 °F (36.8 °C)    TempSrc: Oral    SpO2: 98%    Weight: 175 lb (79.4 kg)    Height: 5' 7\" (1.702 m)    PainSc:   0 - No pain          Assessment and Plan    1. Vitamin D deficiency    - VITAMIN D, 25 HYDROXY; Future    2. Mixed hyperlipidemia    - LIPID PANEL; Future    3. Elevated blood pressure reading without diagnosis of hypertension    - CBC W/O DIFF; Future    4. Type 2 diabetes mellitus without complication, without long-term current use of insulin (HCC)    - HEMOGLOBIN A1C WITH EAG; Future    5. Screening for endocrine, metabolic and immunity disorder    - METABOLIC PANEL, COMPREHENSIVE; Future      MDM    Procedures      *Plan of care reviewed with patient. Patient in agreement with plan and expresses understanding. All questions answered and patient encouraged to call or RTO if further questions or concerns. Advised patient if symptoms worsen to go to nearest ER or call 911. AVS and recommendations given to patient upon discharge.

## 2022-01-27 LAB
A-G RATIO,AGRAT: 2 RATIO (ref 1.1–2.6)
ALBUMIN SERPL-MCNC: 4.9 G/DL (ref 3.5–5)
ALP SERPL-CCNC: 70 U/L (ref 25–115)
ALT SERPL-CCNC: 38 U/L (ref 5–40)
ANION GAP SERPL CALC-SCNC: 12 MMOL/L (ref 3–15)
AST SERPL W P-5'-P-CCNC: 23 U/L (ref 10–37)
BILIRUB SERPL-MCNC: 0.5 MG/DL (ref 0.2–1.2)
BUN SERPL-MCNC: 20 MG/DL (ref 6–22)
CALCIUM SERPL-MCNC: 9.1 MG/DL (ref 8.4–10.5)
CHLORIDE SERPL-SCNC: 104 MMOL/L (ref 98–110)
CO2 SERPL-SCNC: 26 MMOL/L (ref 20–32)
CREAT SERPL-MCNC: 0.9 MG/DL (ref 0.5–1.2)
ERYTHROCYTE [DISTWIDTH] IN BLOOD BY AUTOMATED COUNT: 11.9 % (ref 10–15.5)
GFRAA, 66117: >60
GFRNA, 66118: >60
GLOBULIN,GLOB: 2.5 G/DL (ref 2–4)
GLUCOSE SERPL-MCNC: 181 MG/DL (ref 70–99)
HCT VFR BLD AUTO: 48.3 % (ref 36.6–51.9)
HGB BLD-MCNC: 16.4 G/DL (ref 13.2–17.3)
MCH RBC QN AUTO: 30 PG (ref 26–34)
MCHC RBC AUTO-ENTMCNC: 34 G/DL (ref 31–36)
MCV RBC AUTO: 88 FL (ref 80–95)
PLATELET # BLD AUTO: 269 K/UL (ref 140–440)
PMV BLD AUTO: 11 FL (ref 9–13)
POTASSIUM SERPL-SCNC: 4.5 MMOL/L (ref 3.5–5.5)
PROT SERPL-MCNC: 7.4 G/DL (ref 6.4–8.3)
RBC # BLD AUTO: 5.5 M/UL (ref 3.8–5.8)
SODIUM SERPL-SCNC: 142 MMOL/L (ref 133–145)
WBC # BLD AUTO: 8 K/UL (ref 4–11)

## 2022-01-28 LAB
25(OH)D3 SERPL-MCNC: 84.6 NG/ML (ref 32–100)
AVG GLU, 10930: 219 MG/DL (ref 91–123)
CHOLEST SERPL-MCNC: 102 MG/DL (ref 110–200)
HBA1C MFR BLD HPLC: 9.3 % (ref 4.8–5.6)
HDLC SERPL-MCNC: 3 MG/DL (ref 0–5)
HDLC SERPL-MCNC: 34 MG/DL
LDL/HDL RATIO,LDHD: 1.5
LDLC SERPL CALC-MCNC: 50 MG/DL (ref 50–99)
NON-HDL CHOLESTEROL, 011976: 68 MG/DL
TRIGL SERPL-MCNC: 90 MG/DL (ref 40–149)
VLDLC SERPL CALC-MCNC: 18 MG/DL (ref 8–30)

## 2022-02-01 NOTE — PROGRESS NOTES
Please inform patient that his A1C is 9.3 and glucose was 181. In discussion on previous patient stated over the holidays he was not managing his diet and barely was doing any activities are previously did. Please advise patient to get back on his routine and take medications as prescribed and would like to recheck in 3 months. Please advise to monitor BS.       Other labs were reassuring

## 2022-02-02 ENCOUNTER — TELEPHONE (OUTPATIENT)
Dept: FAMILY MEDICINE CLINIC | Age: 37
End: 2022-02-02

## 2022-02-02 NOTE — TELEPHONE ENCOUNTER
Pt stated he was calling to get lab results and to also update his blood pressure info.  Use the work number [id number]

## 2022-02-03 ENCOUNTER — DOCUMENTATION ONLY (OUTPATIENT)
Dept: FAMILY MEDICINE CLINIC | Age: 37
End: 2022-02-03

## 2022-02-03 DIAGNOSIS — I10 HYPERTENSION, UNSPECIFIED TYPE: Primary | ICD-10-CM

## 2022-02-03 RX ORDER — LISINOPRIL 20 MG/1
20 TABLET ORAL DAILY
Qty: 90 TABLET | Refills: 0 | Status: SHIPPED | OUTPATIENT
Start: 2022-02-03 | End: 2022-02-03 | Stop reason: CLARIF

## 2022-02-03 RX ORDER — LOSARTAN POTASSIUM 25 MG/1
25 TABLET ORAL DAILY
Qty: 90 TABLET | Refills: 0 | Status: SHIPPED | OUTPATIENT
Start: 2022-02-03 | End: 2022-05-16

## 2022-02-03 NOTE — TELEPHONE ENCOUNTER
Patient wants to know if he can cut the 20mg in half he was on this medication previously and it made him extremely light headed.     Thank you

## 2022-02-03 NOTE — TELEPHONE ENCOUNTER
While giving patient lab results he wanted to report that he got a new BP meter and his blood pressure is continually in the 140 over 90's range.   Please advise if anything needs to be added or changed medication wise    Thank you

## 2022-02-03 NOTE — PROGRESS NOTES
Patient was notified of all lab results and recommendations after obtaining identifiers.   Patient verbalized understanding

## 2022-02-03 NOTE — PROGRESS NOTES
Notified [facility] in [place] to cancel Rx for lisinopril HCTZ.   Medication was changed by the provider and patient notied

## 2022-02-03 NOTE — PROGRESS NOTES
Patient called today stating his blood pressure is constantly above 140/90. Patient have been running in this area on previous office visit. Will trial patient on lisinopril 20 mg daily. Advise to monitor blood pressure. Patient is advised to follow up in the office if blood pressure continues to increase or stay the safe after taking medication.

## 2022-02-16 DIAGNOSIS — E11.9 TYPE 2 DIABETES MELLITUS WITHOUT COMPLICATION, WITHOUT LONG-TERM CURRENT USE OF INSULIN (HCC): ICD-10-CM

## 2022-02-19 RX ORDER — GLYBURIDE-METFORMIN HYDROCHLORIDE 5; 500 MG/1; MG/1
TABLET ORAL
Qty: 180 TABLET | Refills: 0 | Status: SHIPPED | OUTPATIENT
Start: 2022-02-19

## 2022-05-02 DIAGNOSIS — I10 HYPERTENSION, UNSPECIFIED TYPE: ICD-10-CM

## 2022-05-09 NOTE — TELEPHONE ENCOUNTER
Blood pressure slightly elevated today at clinic- 150/92. Reports that blood pressure at home is well controlled- reviewed blood pressure readings that he sent our office last week. On Carvedilol, Entresto. Spoke with patient's wife and informed her that his reported blood sugars were not low blood sugars and to continue to keep a log.   And if he experience anything abnormal over this weekend please go to the ER for further eval.

## 2022-05-16 RX ORDER — LOSARTAN POTASSIUM 25 MG/1
TABLET ORAL
Qty: 90 TABLET | Refills: 0 | Status: SHIPPED | OUTPATIENT
Start: 2022-05-16

## 2023-05-09 NOTE — TELEPHONE ENCOUNTER
[facility]ARM[facility]Y:    REQUEST FOR A REFILL OR NEW PRESCRIPTION:    ONE TOUCH ULTRA BLUE TEST STRIP  QTY: 100.0  USE TO CHECK BLOOD SUGAR THREE TIMES A DAY AFTER EACH MEAL
Awake/Alert